# Patient Record
Sex: MALE | Race: WHITE | NOT HISPANIC OR LATINO | Employment: UNEMPLOYED | URBAN - METROPOLITAN AREA
[De-identification: names, ages, dates, MRNs, and addresses within clinical notes are randomized per-mention and may not be internally consistent; named-entity substitution may affect disease eponyms.]

---

## 2017-01-01 ENCOUNTER — HOSPITAL ENCOUNTER (OUTPATIENT)
Dept: PREADMISSION TESTING | Facility: HOSPITAL | Age: 67
Discharge: HOME | End: 2017-12-30
Attending: INTERNAL MEDICINE | Admitting: INTERNAL MEDICINE

## 2017-01-01 LAB
ALBUMIN SERPL-MCNC: 2.9 G/DL (ref 3.5–4.8)
ALBUMIN/GLOB SERPL: 1 {RATIO} (ref 1–1.7)
ALP SERPL-CCNC: 106 IU/L (ref 32–91)
ALT SERPL-CCNC: 9 IU/L (ref 17–63)
ANION GAP SERPL CALC-SCNC: 13.5 MMOL/L (ref 10–20)
AST SERPL-CCNC: 18 IU/L (ref 15–41)
BASOPHILS # BLD AUTO: 0.1 10*3/UL (ref 0–0.2)
BASOPHILS NFR BLD AUTO: 2 % (ref 0–2)
BILIRUB SERPL-MCNC: 1.2 MG/DL (ref 0.3–1.2)
BUN SERPL-MCNC: 11 MG/DL (ref 8–20)
BUN/CREAT SERPL: 4.4 (ref 6.2–20.3)
CALCIUM SERPL-MCNC: 8.6 MG/DL (ref 8.9–10.3)
CHLORIDE SERPL-SCNC: 101 MMOL/L (ref 101–111)
CONV CO2: 26 MMOL/L (ref 22–32)
CONV TOTAL PROTEIN: 5.9 G/DL (ref 6.1–7.9)
CREAT UR-MCNC: 2.5 MG/DL (ref 0.7–1.2)
DIFFERENTIAL METHOD BLD: (no result)
EOSINOPHIL # BLD AUTO: 0 10*3/UL (ref 0–0.3)
EOSINOPHIL # BLD AUTO: 1 % (ref 0–3)
ERYTHROCYTE [DISTWIDTH] IN BLOOD BY AUTOMATED COUNT: 18.3 % (ref 11.5–14.5)
GLOBULIN UR ELPH-MCNC: 3 G/DL (ref 2.5–3.8)
GLUCOSE SERPL-MCNC: 81 MG/DL (ref 65–99)
HCT VFR BLD AUTO: 37.6 % (ref 40–54)
HGB BLD-MCNC: 12 G/DL (ref 14–18)
LYMPHOCYTES # BLD AUTO: 0.5 10*3/UL (ref 0.8–4.8)
LYMPHOCYTES NFR BLD AUTO: 15 % (ref 18–42)
MAGNESIUM SERPL-MCNC: 1.9 MG/DL (ref 1.8–2.5)
MCH RBC QN AUTO: 31.9 PG (ref 26–32)
MCHC RBC AUTO-ENTMCNC: 31.8 G/DL (ref 32–36)
MCV RBC AUTO: 100.1 FL (ref 80–94)
MONOCYTES # BLD AUTO: 0.5 10*3/UL (ref 0.1–1.3)
MONOCYTES NFR BLD AUTO: 16 % (ref 2–11)
NEUTROPHILS # BLD AUTO: 2.4 10*3/UL (ref 2.3–8.6)
NEUTROPHILS NFR BLD AUTO: 66 % (ref 50–75)
NRBC BLD AUTO-RTO: 0 /100{WBCS}
NRBC/RBC NFR BLD MANUAL: 0 10*3/UL
PLATELET # BLD AUTO: 106 10*3/UL (ref 150–450)
PMV BLD AUTO: 8.1 FL (ref 7.4–10.4)
POTASSIUM SERPL-SCNC: 3.5 MMOL/L (ref 3.6–5.1)
RBC # BLD AUTO: 3.76 10*6/UL (ref 4.6–6)
SODIUM SERPL-SCNC: 137 MMOL/L (ref 136–144)
WBC # BLD AUTO: 3.5 10*3/UL (ref 4.5–11.5)

## 2017-02-08 ENCOUNTER — HOSPITAL ENCOUNTER (OUTPATIENT)
Dept: FAMILY MEDICINE CLINIC | Facility: CLINIC | Age: 67
Setting detail: SPECIMEN
Discharge: HOME | End: 2017-02-08
Attending: FAMILY MEDICINE | Admitting: FAMILY MEDICINE

## 2017-02-08 LAB
ALBUMIN SERPL-MCNC: 2.3 G/DL (ref 3.5–4.8)
ALBUMIN/GLOB SERPL: 0.7 {RATIO} (ref 1–1.7)
ALP SERPL-CCNC: 96 IU/L (ref 32–91)
ALT SERPL-CCNC: 8 IU/L (ref 17–63)
ANION GAP SERPL CALC-SCNC: 12.1 MMOL/L (ref 10–20)
AST SERPL-CCNC: 9 IU/L (ref 15–41)
BASOPHILS # BLD AUTO: 0.1 10*3/UL (ref 0–0.2)
BASOPHILS NFR BLD AUTO: 1 % (ref 0–2)
BILIRUB SERPL-MCNC: 0.5 MG/DL (ref 0.3–1.2)
BUN SERPL-MCNC: 62 MG/DL (ref 8–20)
BUN/CREAT SERPL: 17.7 (ref 6.2–20.3)
CALCIUM SERPL-MCNC: 8.7 MG/DL (ref 8.9–10.3)
CHLORIDE SERPL-SCNC: 116 MMOL/L (ref 101–111)
CHOLEST SERPL-MCNC: 100 MG/DL
CHOLEST/HDLC SERPL: 2.8 {RATIO}
CONV CO2: 18 MMOL/L (ref 22–32)
CONV LDL CHOLESTEROL DIRECT: 42 MG/DL (ref 0–100)
CONV TOTAL PROTEIN: 5.5 G/DL (ref 6.1–7.9)
CREAT UR-MCNC: 3.5 MG/DL (ref 0.7–1.2)
DIFFERENTIAL METHOD BLD: (no result)
EOSINOPHIL # BLD AUTO: 0.2 10*3/UL (ref 0–0.3)
EOSINOPHIL # BLD AUTO: 3 % (ref 0–3)
ERYTHROCYTE [DISTWIDTH] IN BLOOD BY AUTOMATED COUNT: 17.7 % (ref 11.5–14.5)
GLOBULIN UR ELPH-MCNC: 3.2 G/DL (ref 2.5–3.8)
GLUCOSE SERPL-MCNC: 104 MG/DL (ref 65–99)
HCT VFR BLD AUTO: 35 % (ref 40–54)
HDLC SERPL-MCNC: 36 MG/DL
HGB BLD-MCNC: 11 G/DL (ref 14–18)
LDLC/HDLC SERPL: 1.2 {RATIO}
LIPID INTERPRETATION: ABNORMAL
LYMPHOCYTES # BLD AUTO: 1.1 10*3/UL (ref 0.8–4.8)
LYMPHOCYTES NFR BLD AUTO: 14 % (ref 18–42)
MCH RBC QN AUTO: 26.8 PG (ref 26–32)
MCHC RBC AUTO-ENTMCNC: 31.5 G/DL (ref 32–36)
MCV RBC AUTO: 85.1 FL (ref 80–94)
MONOCYTES # BLD AUTO: 0.4 10*3/UL (ref 0.1–1.3)
MONOCYTES NFR BLD AUTO: 5 % (ref 2–11)
NEUTROPHILS # BLD AUTO: 5.9 10*3/UL (ref 2.3–8.6)
NEUTROPHILS NFR BLD AUTO: 77 % (ref 50–75)
NRBC BLD AUTO-RTO: 0 /100{WBCS}
NRBC/RBC NFR BLD MANUAL: 0 10*3/UL
PLATELET # BLD AUTO: 309 10*3/UL (ref 150–450)
PMV BLD AUTO: 9.7 FL (ref 7.4–10.4)
POTASSIUM SERPL-SCNC: ABNORMAL MMOL/L
POTASSIUM SERPL-SCNC: ABNORMAL MMOL/L (ref 3.6–5.1)
RBC # BLD AUTO: 4.12 10*6/UL (ref 4.6–6)
SODIUM SERPL-SCNC: 140 MMOL/L (ref 136–144)
TRIGL SERPL-MCNC: 96 MG/DL
VLDLC SERPL CALC-MCNC: 21.7 MG/DL
WBC # BLD AUTO: 7.6 10*3/UL (ref 4.5–11.5)

## 2017-02-13 ENCOUNTER — HOSPITAL ENCOUNTER (OUTPATIENT)
Dept: FAMILY MEDICINE CLINIC | Facility: CLINIC | Age: 67
Setting detail: SPECIMEN
Discharge: HOME | End: 2017-02-13
Attending: FAMILY MEDICINE | Admitting: FAMILY MEDICINE

## 2017-02-13 LAB
ANION GAP SERPL CALC-SCNC: 14.7 MMOL/L (ref 10–20)
BUN SERPL-MCNC: 49 MG/DL (ref 8–20)
BUN/CREAT SERPL: 16.9 (ref 6.2–20.3)
CALCIUM SERPL-MCNC: 9.2 MG/DL (ref 8.9–10.3)
CHLORIDE SERPL-SCNC: 115 MMOL/L (ref 101–111)
CONV CO2: 19 MMOL/L (ref 22–32)
CREAT UR-MCNC: 2.9 MG/DL (ref 0.7–1.2)
GLUCOSE SERPL-MCNC: 166 MG/DL (ref 65–99)
POTASSIUM SERPL-SCNC: 5.7 MMOL/L (ref 3.6–5.1)
SODIUM SERPL-SCNC: 143 MMOL/L (ref 136–144)

## 2017-04-05 ENCOUNTER — ANESTHESIA (OUTPATIENT)
Dept: PERIOP | Facility: HOSPITAL | Age: 67
End: 2017-04-05

## 2017-04-05 ENCOUNTER — APPOINTMENT (OUTPATIENT)
Dept: GENERAL RADIOLOGY | Facility: HOSPITAL | Age: 67
End: 2017-04-05

## 2017-04-05 ENCOUNTER — ANESTHESIA EVENT (OUTPATIENT)
Dept: PERIOP | Facility: HOSPITAL | Age: 67
End: 2017-04-05

## 2017-04-05 ENCOUNTER — APPOINTMENT (OUTPATIENT)
Dept: MRI IMAGING | Facility: HOSPITAL | Age: 67
End: 2017-04-05

## 2017-04-05 ENCOUNTER — HOSPITAL ENCOUNTER (INPATIENT)
Facility: HOSPITAL | Age: 67
LOS: 27 days | Discharge: NURSING, REHAB OR LONG-TERM CARE | End: 2017-05-02
Attending: INTERNAL MEDICINE | Admitting: INTERNAL MEDICINE

## 2017-04-05 DIAGNOSIS — R26.2 DIFFICULTY WALKING: Primary | ICD-10-CM

## 2017-04-05 DIAGNOSIS — R45.1 AGITATION: ICD-10-CM

## 2017-04-05 DIAGNOSIS — R13.10 DYSPHAGIA, UNSPECIFIED TYPE: ICD-10-CM

## 2017-04-05 PROBLEM — I63.9 ISCHEMIC STROKE (HCC): Status: ACTIVE | Noted: 2017-04-05

## 2017-04-05 LAB
ARTERIAL PATENCY WRIST A: ABNORMAL
ATMOSPHERIC PRESS: 741.2 MMHG
BASE EXCESS BLDA CALC-SCNC: -3.4 MMOL/L (ref 0–2)
BDY SITE: ABNORMAL
GLUCOSE BLDC GLUCOMTR-MCNC: 247 MG/DL (ref 70–130)
HCO3 BLDA-SCNC: 21 MMOL/L (ref 22–28)
HOROWITZ INDEX BLD+IHG-RTO: 100 %
MODALITY: ABNORMAL
O2 A-A PPRESDIFF RESPIRATORY: 0.7 MMHG
PCO2 BLDA: 34.9 MM HG (ref 35–45)
PEEP RESPIRATORY: 5 CM[H2O]
PH BLDA: 7.39 PH UNITS (ref 7.35–7.45)
PO2 BLDA: 500.3 MM HG (ref 80–100)
SAO2 % BLDCOA: 100 % (ref 92–99)
SET MECH RESP RATE: 16
TOTAL RATE: 16 BREATHS/MINUTE
VENTILATOR MODE: ABNORMAL
VT ON VENT VENT: 550 ML

## 2017-04-05 PROCEDURE — 82803 BLOOD GASES ANY COMBINATION: CPT

## 2017-04-05 PROCEDURE — 61645 PERQ ART M-THROMBECT &/NFS: CPT | Performed by: NEUROLOGICAL SURGERY

## 2017-04-05 PROCEDURE — C1887 CATHETER, GUIDING: HCPCS | Performed by: NEUROLOGICAL SURGERY

## 2017-04-05 PROCEDURE — C1769 GUIDE WIRE: HCPCS | Performed by: NEUROLOGICAL SURGERY

## 2017-04-05 PROCEDURE — 94799 UNLISTED PULMONARY SVC/PX: CPT

## 2017-04-05 PROCEDURE — B51CYZA FLUOROSCOPY OF LEFT LOWER EXTREMITY VEINS USING OTHER CONTRAST, GUIDANCE: ICD-10-PCS | Performed by: NEUROLOGICAL SURGERY

## 2017-04-05 PROCEDURE — C1773 RET DEV, INSERTABLE: HCPCS | Performed by: NEUROLOGICAL SURGERY

## 2017-04-05 PROCEDURE — 82962 GLUCOSE BLOOD TEST: CPT

## 2017-04-05 PROCEDURE — 25810000003 SODIUM CHLORIDE 0.9 % WITH KCL 20 MEQ 20-0.9 MEQ/L-% SOLUTION: Performed by: NEUROLOGICAL SURGERY

## 2017-04-05 PROCEDURE — C1894 INTRO/SHEATH, NON-LASER: HCPCS | Performed by: NEUROLOGICAL SURGERY

## 2017-04-05 PROCEDURE — 71010 HC CHEST PA OR AP: CPT

## 2017-04-05 PROCEDURE — B31D1ZZ FLUOROSCOPY OF RIGHT VERTEBRAL ARTERY USING LOW OSMOLAR CONTRAST: ICD-10-PCS | Performed by: NEUROLOGICAL SURGERY

## 2017-04-05 PROCEDURE — 25010000002 PROPOFOL 1000 MG/ML EMULSION: Performed by: INTERNAL MEDICINE

## 2017-04-05 PROCEDURE — 70551 MRI BRAIN STEM W/O DYE: CPT

## 2017-04-05 PROCEDURE — 0BH17EZ INSERTION OF ENDOTRACHEAL AIRWAY INTO TRACHEA, VIA NATURAL OR ARTIFICIAL OPENING: ICD-10-PCS | Performed by: INTERNAL MEDICINE

## 2017-04-05 PROCEDURE — 5A1935Z RESPIRATORY VENTILATION, LESS THAN 24 CONSECUTIVE HOURS: ICD-10-PCS | Performed by: INTERNAL MEDICINE

## 2017-04-05 PROCEDURE — 94002 VENT MGMT INPAT INIT DAY: CPT

## 2017-04-05 PROCEDURE — 75898 FOLLOW-UP ANGIOGRAPHY: CPT

## 2017-04-05 PROCEDURE — 0 IODIXANOL PER 1 ML: Performed by: INTERNAL MEDICINE

## 2017-04-05 PROCEDURE — B31R1ZZ FLUOROSCOPY OF INTRACRANIAL ARTERIES USING LOW OSMOLAR CONTRAST: ICD-10-PCS | Performed by: NEUROLOGICAL SURGERY

## 2017-04-05 PROCEDURE — 99223 1ST HOSP IP/OBS HIGH 75: CPT | Performed by: NEUROLOGICAL SURGERY

## 2017-04-05 PROCEDURE — 25010000002 PROPOFOL 10 MG/ML EMULSION: Performed by: INTERNAL MEDICINE

## 2017-04-05 PROCEDURE — 03CG3ZZ EXTIRPATION OF MATTER FROM INTRACRANIAL ARTERY, PERCUTANEOUS APPROACH: ICD-10-PCS | Performed by: NEUROLOGICAL SURGERY

## 2017-04-05 PROCEDURE — 25010000002 HEPARIN (PORCINE) PER 1000 UNITS: Performed by: NEUROLOGICAL SURGERY

## 2017-04-05 PROCEDURE — 25010000002 PROPOFOL 1000 MG/ML EMULSION: Performed by: NURSE ANESTHETIST, CERTIFIED REGISTERED

## 2017-04-05 RX ORDER — PANTOPRAZOLE SODIUM 40 MG/1
40 TABLET, DELAYED RELEASE ORAL DAILY
COMMUNITY

## 2017-04-05 RX ORDER — CARVEDILOL 25 MG/1
25 TABLET ORAL DAILY
COMMUNITY

## 2017-04-05 RX ORDER — INSULIN GLARGINE 100 [IU]/ML
30 INJECTION, SOLUTION SUBCUTANEOUS
COMMUNITY

## 2017-04-05 RX ORDER — SODIUM CHLORIDE AND POTASSIUM CHLORIDE 150; 900 MG/100ML; MG/100ML
100 INJECTION, SOLUTION INTRAVENOUS CONTINUOUS
Status: DISCONTINUED | OUTPATIENT
Start: 2017-04-05 | End: 2017-04-06

## 2017-04-05 RX ORDER — ASPIRIN 300 MG/1
300 SUPPOSITORY RECTAL DAILY
Status: DISCONTINUED | OUTPATIENT
Start: 2017-04-06 | End: 2017-05-02 | Stop reason: HOSPADM

## 2017-04-05 RX ORDER — DOXAZOSIN MESYLATE 4 MG/1
4 TABLET ORAL NIGHTLY
COMMUNITY

## 2017-04-05 RX ORDER — ATORVASTATIN CALCIUM 80 MG/1
80 TABLET, FILM COATED ORAL NIGHTLY
Status: DISCONTINUED | OUTPATIENT
Start: 2017-04-05 | End: 2017-04-06

## 2017-04-05 RX ORDER — ONDANSETRON 2 MG/ML
4 INJECTION INTRAMUSCULAR; INTRAVENOUS EVERY 6 HOURS PRN
Status: DISCONTINUED | OUTPATIENT
Start: 2017-04-05 | End: 2017-05-02 | Stop reason: HOSPADM

## 2017-04-05 RX ORDER — ASPIRIN 81 MG/1
81 TABLET, CHEWABLE ORAL DAILY
COMMUNITY
End: 2017-05-02 | Stop reason: HOSPADM

## 2017-04-05 RX ORDER — FENTANYL CITRATE 50 UG/ML
50 INJECTION, SOLUTION INTRAMUSCULAR; INTRAVENOUS
Status: DISCONTINUED | OUTPATIENT
Start: 2017-04-05 | End: 2017-04-07

## 2017-04-05 RX ORDER — ASPIRIN 325 MG
325 TABLET ORAL DAILY
Status: DISCONTINUED | OUTPATIENT
Start: 2017-04-06 | End: 2017-05-02 | Stop reason: HOSPADM

## 2017-04-05 RX ORDER — SODIUM CHLORIDE 9 MG/ML
INJECTION, SOLUTION INTRAVENOUS CONTINUOUS PRN
Status: DISCONTINUED | OUTPATIENT
Start: 2017-04-05 | End: 2017-04-05 | Stop reason: SURG

## 2017-04-05 RX ORDER — INSULIN GLARGINE 100 [IU]/ML
80 INJECTION, SOLUTION SUBCUTANEOUS NIGHTLY
COMMUNITY

## 2017-04-05 RX ORDER — DEXTROSE MONOHYDRATE 25 G/50ML
25 INJECTION, SOLUTION INTRAVENOUS
Status: DISCONTINUED | OUTPATIENT
Start: 2017-04-05 | End: 2017-05-02 | Stop reason: HOSPADM

## 2017-04-05 RX ORDER — SODIUM CHLORIDE 0.9 % (FLUSH) 0.9 %
1-10 SYRINGE (ML) INJECTION AS NEEDED
Status: DISCONTINUED | OUTPATIENT
Start: 2017-04-05 | End: 2017-05-02 | Stop reason: HOSPADM

## 2017-04-05 RX ORDER — NICOTINE POLACRILEX 4 MG
15 LOZENGE BUCCAL
Status: DISCONTINUED | OUTPATIENT
Start: 2017-04-05 | End: 2017-05-02 | Stop reason: HOSPADM

## 2017-04-05 RX ORDER — MELATONIN
1000 DAILY
COMMUNITY

## 2017-04-05 RX ORDER — ATORVASTATIN CALCIUM 40 MG/1
40 TABLET, FILM COATED ORAL DAILY
COMMUNITY

## 2017-04-05 RX ORDER — IODIXANOL 320 MG/ML
350 INJECTION, SOLUTION INTRAVASCULAR
Status: COMPLETED | OUTPATIENT
Start: 2017-04-05 | End: 2017-04-05

## 2017-04-05 RX ADMIN — IODIXANOL 145.6 ML: 320 INJECTION, SOLUTION INTRAVASCULAR at 14:15

## 2017-04-05 RX ADMIN — SODIUM CHLORIDE: 9 INJECTION, SOLUTION INTRAVENOUS at 14:47

## 2017-04-05 RX ADMIN — PROPOFOL 50 MCG/KG/MIN: 10 INJECTION, EMULSION INTRAVENOUS at 20:45

## 2017-04-05 RX ADMIN — PROPOFOL 40 MCG/KG/MIN: 10 INJECTION, EMULSION INTRAVENOUS at 14:47

## 2017-04-05 RX ADMIN — POTASSIUM CHLORIDE AND SODIUM CHLORIDE 100 ML/HR: 900; 150 INJECTION, SOLUTION INTRAVENOUS at 19:27

## 2017-04-05 RX ADMIN — PROPOFOL 45 MCG/KG/MIN: 10 INJECTION, EMULSION INTRAVENOUS at 19:06

## 2017-04-05 NOTE — ANESTHESIA PREPROCEDURE EVALUATION
Anesthesia Evaluation        Airway   Dental      Pulmonary - normal exam   Cardiovascular - normal exam    Rhythm: regular  Rate: normal    (+) hypertension,       Neuro/Psych  (+) CVA (2 weeks ago,and this AM),    GI/Hepatic/Renal/Endo    (+) obesity,  diabetes mellitus,     Musculoskeletal     Abdominal   (+) obese,    Substance History      OB/GYN          Other            Phys Exam Other: Pt arrived to Room 19 per EMS.  Intubated upon arrival.  History per surgeon                            Anesthesia Plan    ASA 3 - emergent     general

## 2017-04-05 NOTE — ANESTHESIA POSTPROCEDURE EVALUATION
Patient: Bill Armendariz    Procedure Summary     Date Anesthesia Start Anesthesia Stop Room / Location    04/05/17 1447 1622  [facility] 19 INV /  [facility] HY[facility]ID OR 18/19       Procedure Diagnosis Provider Provider    EMBOLIZATION CEREBRAL (N/A ) No diagnosis on file. MD Lizbeth Purvis MD          Anesthesia Type: general  Last vitals  /86 (04/05/17 1647)    Temp 36.1 °C (96.9 °F) (04/05/17 1647)    Pulse 57 (04/05/17 1647)   Resp 16 (04/05/17 1647)    SpO2 97 % (04/05/17 1647)      Post Anesthesia Care and Evaluation    Patient location during evaluation: PACU  Patient participation: complete - patient participated  Level of consciousness: awake and alert  Pain management: adequate  Airway patency: patent  Anesthetic complications: No anesthetic complications    Cardiovascular status: acceptable  Respiratory status: acceptable  Hydration status: acceptable

## 2017-04-06 ENCOUNTER — APPOINTMENT (OUTPATIENT)
Dept: CT IMAGING | Facility: HOSPITAL | Age: 67
End: 2017-04-06

## 2017-04-06 ENCOUNTER — APPOINTMENT (OUTPATIENT)
Dept: CARDIOLOGY | Facility: HOSPITAL | Age: 67
End: 2017-04-06

## 2017-04-06 ENCOUNTER — APPOINTMENT (OUTPATIENT)
Dept: GENERAL RADIOLOGY | Facility: HOSPITAL | Age: 67
End: 2017-04-06
Attending: INTERNAL MEDICINE

## 2017-04-06 ENCOUNTER — APPOINTMENT (OUTPATIENT)
Dept: CARDIOLOGY | Facility: HOSPITAL | Age: 67
End: 2017-04-06
Attending: NEUROLOGICAL SURGERY

## 2017-04-06 ENCOUNTER — APPOINTMENT (OUTPATIENT)
Dept: GENERAL RADIOLOGY | Facility: HOSPITAL | Age: 67
End: 2017-04-06

## 2017-04-06 LAB
ALBUMIN SERPL-MCNC: 2.2 G/DL (ref 3.5–5.2)
ALBUMIN/GLOB SERPL: 0.7 G/DL
ALP SERPL-CCNC: 110 U/L (ref 39–117)
ALT SERPL W P-5'-P-CCNC: 9 U/L (ref 1–41)
ANION GAP SERPL CALCULATED.3IONS-SCNC: 16.3 MMOL/L
ARTERIAL PATENCY WRIST A: ABNORMAL
ARTERIAL PATENCY WRIST A: ABNORMAL
AST SERPL-CCNC: 11 U/L (ref 1–40)
ATMOSPHERIC PRESS: 738.5 MMHG
ATMOSPHERIC PRESS: 740.2 MMHG
BASE EXCESS BLDA CALC-SCNC: -5 MMOL/L (ref 0–2)
BASE EXCESS BLDA CALC-SCNC: -6.2 MMOL/L (ref 0–2)
BASOPHILS # BLD AUTO: 0.06 10*3/MM3 (ref 0–0.2)
BASOPHILS NFR BLD AUTO: 0.5 % (ref 0–1.5)
BDY SITE: ABNORMAL
BDY SITE: ABNORMAL
BH CV ECHO MEAS - ACS: 1.4 CM
BH CV ECHO MEAS - AO MEAN PG (FULL): 1 MMHG
BH CV ECHO MEAS - AO MEAN PG: 2 MMHG
BH CV ECHO MEAS - AO ROOT AREA (BSA CORRECTED): 1.2
BH CV ECHO MEAS - AO ROOT AREA: 5.7 CM^2
BH CV ECHO MEAS - AO ROOT DIAM: 2.7 CM
BH CV ECHO MEAS - AO V2 MAX: 103 CM/SEC
BH CV ECHO MEAS - AO V2 MEAN: 65.4 CM/SEC
BH CV ECHO MEAS - AO V2 VTI: 17.5 CM
BH CV ECHO MEAS - AVA(I,A): 2.4 CM^2
BH CV ECHO MEAS - AVA(I,D): 2.4 CM^2
BH CV ECHO MEAS - BSA(HAYCOCK): 2.3 M^2
BH CV ECHO MEAS - BSA: 2.2 M^2
BH CV ECHO MEAS - BZI_BMI: 36.8 KILOGRAMS/M^2
BH CV ECHO MEAS - BZI_METRIC_HEIGHT: 170.2 CM
BH CV ECHO MEAS - BZI_METRIC_WEIGHT: 106.6 KG
BH CV ECHO MEAS - CONTRAST EF (2CH): 25.5 ML/M^2
BH CV ECHO MEAS - CONTRAST EF 4CH: 26.1 ML/M^2
BH CV ECHO MEAS - EDV(CUBED): 227 ML
BH CV ECHO MEAS - EDV(MOD-SP2): 157 ML
BH CV ECHO MEAS - EDV(MOD-SP4): 134 ML
BH CV ECHO MEAS - EDV(TEICH): 186.9 ML
BH CV ECHO MEAS - EF(CUBED): 26.7 %
BH CV ECHO MEAS - EF(MOD-SP2): 25.5 %
BH CV ECHO MEAS - EF(MOD-SP4): 26.1 %
BH CV ECHO MEAS - EF(TEICH): 21.1 %
BH CV ECHO MEAS - ESV(CUBED): 166.4 ML
BH CV ECHO MEAS - ESV(MOD-SP2): 117 ML
BH CV ECHO MEAS - ESV(MOD-SP4): 99 ML
BH CV ECHO MEAS - ESV(TEICH): 147.4 ML
BH CV ECHO MEAS - FS: 9.8 %
BH CV ECHO MEAS - IVS/LVPW: 1
BH CV ECHO MEAS - IVSD: 1 CM
BH CV ECHO MEAS - LAT PEAK E' VEL: 5 CM/SEC
BH CV ECHO MEAS - LV DIASTOLIC VOL/BSA (35-75): 61.9 ML/M^2
BH CV ECHO MEAS - LV MASS(C)D: 253.9 GRAMS
BH CV ECHO MEAS - LV MASS(C)DI: 117.3 GRAMS/M^2
BH CV ECHO MEAS - LV MEAN PG: 1 MMHG
BH CV ECHO MEAS - LV SYSTOLIC VOL/BSA (12-30): 45.7 ML/M^2
BH CV ECHO MEAS - LV V1 MAX: 84 CM/SEC
BH CV ECHO MEAS - LV V1 MEAN: 52.8 CM/SEC
BH CV ECHO MEAS - LV V1 VTI: 13.2 CM
BH CV ECHO MEAS - LVIDD: 6.1 CM
BH CV ECHO MEAS - LVIDS: 5.5 CM
BH CV ECHO MEAS - LVLD AP2: 9.3 CM
BH CV ECHO MEAS - LVLD AP4: 8.9 CM
BH CV ECHO MEAS - LVLS AP2: 9 CM
BH CV ECHO MEAS - LVLS AP4: 8.6 CM
BH CV ECHO MEAS - LVOT AREA (M): 3.1 CM^2
BH CV ECHO MEAS - LVOT AREA: 3.1 CM^2
BH CV ECHO MEAS - LVOT DIAM: 2 CM
BH CV ECHO MEAS - LVPWD: 1 CM
BH CV ECHO MEAS - MED PEAK E' VEL: 4 CM/SEC
BH CV ECHO MEAS - MR MAX PG: 67.2 MMHG
BH CV ECHO MEAS - MR MAX VEL: 410 CM/SEC
BH CV ECHO MEAS - MV A DUR: 0.1 SEC
BH CV ECHO MEAS - MV A MAX VEL: 47.4 CM/SEC
BH CV ECHO MEAS - MV DEC SLOPE: 697 CM/SEC^2
BH CV ECHO MEAS - MV DEC TIME: 0.08 SEC
BH CV ECHO MEAS - MV E MAX VEL: 88.8 CM/SEC
BH CV ECHO MEAS - MV E/A: 1.9
BH CV ECHO MEAS - MV MEAN PG: 1 MMHG
BH CV ECHO MEAS - MV P1/2T MAX VEL: 87.3 CM/SEC
BH CV ECHO MEAS - MV P1/2T: 36.7 MSEC
BH CV ECHO MEAS - MV V2 MEAN: 39.8 CM/SEC
BH CV ECHO MEAS - MV V2 VTI: 18.9 CM
BH CV ECHO MEAS - MVA P1/2T LCG: 2.5 CM^2
BH CV ECHO MEAS - MVA(P1/2T): 6 CM^2
BH CV ECHO MEAS - MVA(VTI): 2.2 CM^2
BH CV ECHO MEAS - PA ACC SLOPE: 0 CM/SEC^2
BH CV ECHO MEAS - PA ACC TIME: 0.12 SEC
BH CV ECHO MEAS - PA MAX PG (FULL): 1 MMHG
BH CV ECHO MEAS - PA MAX PG: 2.5 MMHG
BH CV ECHO MEAS - PA PR(ACCEL): 25 MMHG
BH CV ECHO MEAS - PA V2 MAX: 79.3 CM/SEC
BH CV ECHO MEAS - PI END-D VEL: 123 CM/SEC
BH CV ECHO MEAS - PVA(V,A): 2.4 CM^2
BH CV ECHO MEAS - PVA(V,D): 2.4 CM^2
BH CV ECHO MEAS - QP/QS: 0.87
BH CV ECHO MEAS - RAP SYSTOLE: 15 MMHG
BH CV ECHO MEAS - RV MAX PG: 1.5 MMHG
BH CV ECHO MEAS - RV MEAN PG: 1 MMHG
BH CV ECHO MEAS - RV V1 MAX: 60.9 CM/SEC
BH CV ECHO MEAS - RV V1 MEAN: 39 CM/SEC
BH CV ECHO MEAS - RV V1 VTI: 11.5 CM
BH CV ECHO MEAS - RVOT AREA: 3.1 CM^2
BH CV ECHO MEAS - RVOT DIAM: 2 CM
BH CV ECHO MEAS - RVSP: 74 MMHG
BH CV ECHO MEAS - SI(AO): 46.3 ML/M^2
BH CV ECHO MEAS - SI(CUBED): 28 ML/M^2
BH CV ECHO MEAS - SI(LVOT): 19.1 ML/M^2
BH CV ECHO MEAS - SI(MOD-SP2): 18.5 ML/M^2
BH CV ECHO MEAS - SI(MOD-SP4): 16.2 ML/M^2
BH CV ECHO MEAS - SI(TEICH): 18.2 ML/M^2
BH CV ECHO MEAS - SV(AO): 100.2 ML
BH CV ECHO MEAS - SV(CUBED): 60.6 ML
BH CV ECHO MEAS - SV(LVOT): 41.5 ML
BH CV ECHO MEAS - SV(MOD-SP2): 40 ML
BH CV ECHO MEAS - SV(MOD-SP4): 35 ML
BH CV ECHO MEAS - SV(RVOT): 36.1 ML
BH CV ECHO MEAS - SV(TEICH): 39.5 ML
BH CV ECHO MEAS - TAPSE (>1.6): 1.4 CM2
BH CV ECHO MEAS - TR MAX VEL: 384 CM/SEC
BH CV XLRA - RV BASE: 2.8 CM
BH CV XLRA - TDI S': 9 CM/SEC
BILIRUB SERPL-MCNC: 0.5 MG/DL (ref 0.1–1.2)
BUN BLD-MCNC: 28 MG/DL (ref 8–23)
BUN/CREAT SERPL: 14.1 (ref 7–25)
CALCIUM SPEC-SCNC: 8.3 MG/DL (ref 8.6–10.5)
CHLORIDE SERPL-SCNC: 107 MMOL/L (ref 98–107)
CHOLEST SERPL-MCNC: 111 MG/DL (ref 0–200)
CO2 SERPL-SCNC: 16.7 MMOL/L (ref 22–29)
CREAT BLD-MCNC: 1.98 MG/DL (ref 0.76–1.27)
DEPRECATED RDW RBC AUTO: 52.9 FL (ref 37–54)
E/E' RATIO: 20
EOSINOPHIL # BLD AUTO: 0.18 10*3/MM3 (ref 0–0.7)
EOSINOPHIL NFR BLD AUTO: 1.5 % (ref 0.3–6.2)
ERYTHROCYTE [DISTWIDTH] IN BLOOD BY AUTOMATED COUNT: 17.1 % (ref 11.5–14.5)
FERRITIN SERPL-MCNC: 34.97 NG/ML (ref 30–400)
GFR SERPL CREATININE-BSD FRML MDRD: 34 ML/MIN/1.73
GLOBULIN UR ELPH-MCNC: 3 GM/DL
GLUCOSE BLD-MCNC: 185 MG/DL (ref 65–99)
GLUCOSE BLDC GLUCOMTR-MCNC: 137 MG/DL (ref 70–130)
GLUCOSE BLDC GLUCOMTR-MCNC: 162 MG/DL (ref 70–130)
GLUCOSE BLDC GLUCOMTR-MCNC: 165 MG/DL (ref 70–130)
GLUCOSE BLDC GLUCOMTR-MCNC: 188 MG/DL (ref 70–130)
HBA1C MFR BLD: 9.85 % (ref 4.8–5.6)
HCO3 BLDA-SCNC: 18.2 MMOL/L (ref 22–28)
HCO3 BLDA-SCNC: 19.8 MMOL/L (ref 22–28)
HCT VFR BLD AUTO: 39 % (ref 40.4–52.2)
HDLC SERPL-MCNC: 41 MG/DL (ref 40–60)
HGB BLD-MCNC: 11.6 G/DL (ref 13.7–17.6)
HOROWITZ INDEX BLD+IHG-RTO: 40 %
IMM GRANULOCYTES # BLD: 0.03 10*3/MM3 (ref 0–0.03)
IMM GRANULOCYTES NFR BLD: 0.3 % (ref 0–0.5)
IRON 24H UR-MRATE: 18 MCG/DL (ref 59–158)
IRON SATN MFR SERPL: 6 % (ref 20–50)
LDLC SERPL CALC-MCNC: 41 MG/DL (ref 0–100)
LDLC/HDLC SERPL: 1.01 {RATIO}
LEFT ATRIUM VOLUME INDEX: 29 ML/M2
LYMPHOCYTES # BLD AUTO: 0.98 10*3/MM3 (ref 0.9–4.8)
LYMPHOCYTES NFR BLD AUTO: 8.2 % (ref 19.6–45.3)
MCH RBC QN AUTO: 25.4 PG (ref 27–32.7)
MCHC RBC AUTO-ENTMCNC: 29.7 G/DL (ref 32.6–36.4)
MCV RBC AUTO: 85.3 FL (ref 79.8–96.2)
MODALITY: ABNORMAL
MODALITY: ABNORMAL
MONOCYTES # BLD AUTO: 0.79 10*3/MM3 (ref 0.2–1.2)
MONOCYTES NFR BLD AUTO: 6.6 % (ref 5–12)
NEUTROPHILS # BLD AUTO: 9.88 10*3/MM3 (ref 1.9–8.1)
NEUTROPHILS NFR BLD AUTO: 82.9 % (ref 42.7–76)
O2 A-A PPRESDIFF RESPIRATORY: 0.7 MMHG
PCO2 BLDA: 32.2 MM HG (ref 35–45)
PCO2 BLDA: 35.4 MM HG (ref 35–45)
PEEP RESPIRATORY: 5 CM[H2O]
PEEP RESPIRATORY: 5 CM[H2O]
PH BLDA: 7.36 PH UNITS (ref 7.35–7.45)
PH BLDA: 7.36 PH UNITS (ref 7.35–7.45)
PLATELET # BLD AUTO: 303 10*3/MM3 (ref 140–500)
PMV BLD AUTO: 11.1 FL (ref 6–12)
PO2 BLDA: 124.5 MM HG (ref 80–100)
PO2 BLDA: 170 MM HG (ref 80–100)
POTASSIUM BLD-SCNC: 4.5 MMOL/L (ref 3.5–5.2)
PROT SERPL-MCNC: 5.2 G/DL (ref 6–8.5)
PSV: 10 CMH2O
RBC # BLD AUTO: 4.57 10*6/MM3 (ref 4.6–6)
SAO2 % BLDCOA: 98.7 % (ref 92–99)
SAO2 % BLDCOA: 99.5 % (ref 92–99)
SET MECH RESP RATE: 16
SODIUM BLD-SCNC: 140 MMOL/L (ref 136–145)
TIBC SERPL-MCNC: 305 MCG/DL
TOTAL RATE: 16 BREATHS/MINUTE
TOTAL RATE: 24 BREATHS/MINUTE
TRANSFERRIN SERPL-MCNC: 205 MG/DL (ref 200–360)
TRIGL SERPL-MCNC: 143 MG/DL (ref 0–150)
VENTILATOR MODE: ABNORMAL
VENTILATOR MODE: ABNORMAL
VLDLC SERPL-MCNC: 28.6 MG/DL (ref 5–40)
VT ON VENT VENT: 550 ML
WBC NRBC COR # BLD: 11.92 10*3/MM3 (ref 4.5–10.7)

## 2017-04-06 PROCEDURE — 80053 COMPREHEN METABOLIC PANEL: CPT | Performed by: INTERNAL MEDICINE

## 2017-04-06 PROCEDURE — 36600 WITHDRAWAL OF ARTERIAL BLOOD: CPT

## 2017-04-06 PROCEDURE — 93880 EXTRACRANIAL BILAT STUDY: CPT

## 2017-04-06 PROCEDURE — 63710000001 INSULIN ASPART PER 5 UNITS: Performed by: INTERNAL MEDICINE

## 2017-04-06 PROCEDURE — 83036 HEMOGLOBIN GLYCOSYLATED A1C: CPT | Performed by: NEUROLOGICAL SURGERY

## 2017-04-06 PROCEDURE — 70450 CT HEAD/BRAIN W/O DYE: CPT

## 2017-04-06 PROCEDURE — 92610 EVALUATE SWALLOWING FUNCTION: CPT

## 2017-04-06 PROCEDURE — 99222 1ST HOSP IP/OBS MODERATE 55: CPT | Performed by: NURSE PRACTITIONER

## 2017-04-06 PROCEDURE — 85025 COMPLETE CBC W/AUTO DIFF WBC: CPT | Performed by: INTERNAL MEDICINE

## 2017-04-06 PROCEDURE — 94799 UNLISTED PULMONARY SVC/PX: CPT

## 2017-04-06 PROCEDURE — 25810000003 SODIUM CHLORIDE 0.9 % WITH KCL 20 MEQ 20-0.9 MEQ/L-% SOLUTION: Performed by: NEUROLOGICAL SURGERY

## 2017-04-06 PROCEDURE — 84466 ASSAY OF TRANSFERRIN: CPT | Performed by: INTERNAL MEDICINE

## 2017-04-06 PROCEDURE — 86334 IMMUNOFIX E-PHORESIS SERUM: CPT | Performed by: INTERNAL MEDICINE

## 2017-04-06 PROCEDURE — 25010000002 FENTANYL CITRATE (PF) 100 MCG/2ML SOLUTION: Performed by: INTERNAL MEDICINE

## 2017-04-06 PROCEDURE — 93306 TTE W/DOPPLER COMPLETE: CPT

## 2017-04-06 PROCEDURE — 83540 ASSAY OF IRON: CPT | Performed by: INTERNAL MEDICINE

## 2017-04-06 PROCEDURE — 25010000002 PROPOFOL 1000 MG/ML EMULSION: Performed by: INTERNAL MEDICINE

## 2017-04-06 PROCEDURE — 71010 HC CHEST PA OR AP: CPT

## 2017-04-06 PROCEDURE — 73560 X-RAY EXAM OF KNEE 1 OR 2: CPT

## 2017-04-06 PROCEDURE — 82728 ASSAY OF FERRITIN: CPT | Performed by: INTERNAL MEDICINE

## 2017-04-06 PROCEDURE — 80061 LIPID PANEL: CPT | Performed by: NEUROLOGICAL SURGERY

## 2017-04-06 PROCEDURE — 82962 GLUCOSE BLOOD TEST: CPT

## 2017-04-06 PROCEDURE — 93306 TTE W/DOPPLER COMPLETE: CPT | Performed by: INTERNAL MEDICINE

## 2017-04-06 PROCEDURE — 99233 SBSQ HOSP IP/OBS HIGH 50: CPT | Performed by: NURSE PRACTITIONER

## 2017-04-06 PROCEDURE — 82803 BLOOD GASES ANY COMBINATION: CPT

## 2017-04-06 RX ORDER — ATORVASTATIN CALCIUM 20 MG/1
40 TABLET, FILM COATED ORAL NIGHTLY
Status: DISCONTINUED | OUTPATIENT
Start: 2017-04-06 | End: 2017-04-26 | Stop reason: CLARIF

## 2017-04-06 RX ORDER — SODIUM CHLORIDE 9 MG/ML
50 INJECTION, SOLUTION INTRAVENOUS CONTINUOUS
Status: DISCONTINUED | OUTPATIENT
Start: 2017-04-06 | End: 2017-04-08

## 2017-04-06 RX ORDER — PANTOPRAZOLE SODIUM 40 MG/1
40 TABLET, DELAYED RELEASE ORAL
Status: DISCONTINUED | OUTPATIENT
Start: 2017-04-07 | End: 2017-05-02 | Stop reason: HOSPADM

## 2017-04-06 RX ADMIN — SODIUM CHLORIDE 100 ML/HR: 9 INJECTION, SOLUTION INTRAVENOUS at 10:53

## 2017-04-06 RX ADMIN — PROPOFOL 10 MCG/KG/MIN: 10 INJECTION, EMULSION INTRAVENOUS at 06:20

## 2017-04-06 RX ADMIN — FENTANYL CITRATE 50 MCG: 50 INJECTION INTRAMUSCULAR; INTRAVENOUS at 11:23

## 2017-04-06 RX ADMIN — INSULIN ASPART 2 UNITS: 100 INJECTION, SOLUTION INTRAVENOUS; SUBCUTANEOUS at 04:33

## 2017-04-06 RX ADMIN — ATORVASTATIN CALCIUM 40 MG: 80 TABLET, FILM COATED ORAL at 20:30

## 2017-04-06 RX ADMIN — ASPIRIN 325 MG: 325 TABLET ORAL at 15:57

## 2017-04-06 RX ADMIN — INSULIN ASPART 2 UNITS: 100 INJECTION, SOLUTION INTRAVENOUS; SUBCUTANEOUS at 15:57

## 2017-04-06 RX ADMIN — INSULIN ASPART 2 UNITS: 100 INJECTION, SOLUTION INTRAVENOUS; SUBCUTANEOUS at 08:54

## 2017-04-06 RX ADMIN — POTASSIUM CHLORIDE AND SODIUM CHLORIDE 100 ML/HR: 900; 150 INJECTION, SOLUTION INTRAVENOUS at 06:20

## 2017-04-06 RX ADMIN — INSULIN ASPART 2 UNITS: 100 INJECTION, SOLUTION INTRAVENOUS; SUBCUTANEOUS at 12:01

## 2017-04-07 ENCOUNTER — APPOINTMENT (OUTPATIENT)
Dept: CT IMAGING | Facility: HOSPITAL | Age: 67
End: 2017-04-07

## 2017-04-07 LAB
ALBUMIN SERPL-MCNC: 2.3 G/DL (ref 3.5–5.2)
ANION GAP SERPL CALCULATED.3IONS-SCNC: 12.8 MMOL/L
ANISOCYTOSIS BLD QL: NORMAL
BASOPHILS # BLD AUTO: 0.09 10*3/MM3 (ref 0–0.2)
BASOPHILS NFR BLD AUTO: 1 % (ref 0–1.5)
BH CV XLRA MEAS LEFT DIST CCA EDV: -11.8 CM/SEC
BH CV XLRA MEAS LEFT DIST CCA PSV: -46 CM/SEC
BH CV XLRA MEAS LEFT DIST ICA EDV: -24.8 CM/SEC
BH CV XLRA MEAS LEFT DIST ICA PSV: -62.4 CM/SEC
BH CV XLRA MEAS LEFT ICA/CCA RATIO: 1.3
BH CV XLRA MEAS LEFT MID ICA EDV: -19.3 CM/SEC
BH CV XLRA MEAS LEFT MID ICA PSV: -50.4 CM/SEC
BH CV XLRA MEAS LEFT PROX CCA EDV: -10.6 CM/SEC
BH CV XLRA MEAS LEFT PROX CCA PSV: -81.5 CM/SEC
BH CV XLRA MEAS LEFT PROX ECA PSV: -53.4 CM/SEC
BH CV XLRA MEAS LEFT PROX ICA EDV: -18.8 CM/SEC
BH CV XLRA MEAS LEFT PROX ICA PSV: -56.3 CM/SEC
BH CV XLRA MEAS LEFT PROX SCLA PSV: 117 CM/SEC
BH CV XLRA MEAS LEFT VERTEBRAL A EDV: -9.8 CM/SEC
BH CV XLRA MEAS LEFT VERTEBRAL A PSV: -46 CM/SEC
BH CV XLRA MEAS RIGHT DIST CCA EDV: -9.8 CM/SEC
BH CV XLRA MEAS RIGHT DIST CCA PSV: -71.5 CM/SEC
BH CV XLRA MEAS RIGHT DIST ICA EDV: -24.6 CM/SEC
BH CV XLRA MEAS RIGHT DIST ICA PSV: -65.1 CM/SEC
BH CV XLRA MEAS RIGHT ICA/CCA RATIO: 0.9
BH CV XLRA MEAS RIGHT MID ICA EDV: 20.5 CM/SEC
BH CV XLRA MEAS RIGHT MID ICA PSV: 62.1 CM/SEC
BH CV XLRA MEAS RIGHT PROX CCA EDV: -10 CM/SEC
BH CV XLRA MEAS RIGHT PROX CCA PSV: -85 CM/SEC
BH CV XLRA MEAS RIGHT PROX ECA PSV: -95.6 CM/SEC
BH CV XLRA MEAS RIGHT PROX ICA EDV: -9.4 CM/SEC
BH CV XLRA MEAS RIGHT PROX ICA PSV: -44.4 CM/SEC
BH CV XLRA MEAS RIGHT PROX SCLA PSV: 105 CM/SEC
BH CV XLRA MEAS RIGHT VERTEBRAL A EDV: 10 CM/SEC
BH CV XLRA MEAS RIGHT VERTEBRAL A PSV: 53.9 CM/SEC
BUN BLD-MCNC: 30 MG/DL (ref 8–23)
BUN/CREAT SERPL: 14.1 (ref 7–25)
BURR CELLS BLD QL SMEAR: NORMAL
CALCIUM SPEC-SCNC: 8.4 MG/DL (ref 8.6–10.5)
CHLORIDE SERPL-SCNC: 112 MMOL/L (ref 98–107)
CHLORIDE UR-SCNC: 61 MMOL/L
CO2 SERPL-SCNC: 19.2 MMOL/L (ref 22–29)
CREAT BLD-MCNC: 2.13 MG/DL (ref 0.76–1.27)
CREAT UR-MCNC: 86.3 MG/DL
DEPRECATED RDW RBC AUTO: 60.6 FL (ref 37–54)
EOSINOPHIL # BLD AUTO: 0.35 10*3/MM3 (ref 0–0.7)
EOSINOPHIL NFR BLD AUTO: 3.8 % (ref 0.3–6.2)
ERYTHROCYTE [DISTWIDTH] IN BLOOD BY AUTOMATED COUNT: 21.3 % (ref 11.5–14.5)
GFR SERPL CREATININE-BSD FRML MDRD: 31 ML/MIN/1.73
GLUCOSE BLD-MCNC: 131 MG/DL (ref 65–99)
GLUCOSE BLDC GLUCOMTR-MCNC: 105 MG/DL (ref 70–130)
GLUCOSE BLDC GLUCOMTR-MCNC: 110 MG/DL (ref 70–130)
GLUCOSE BLDC GLUCOMTR-MCNC: 117 MG/DL (ref 70–130)
GLUCOSE BLDC GLUCOMTR-MCNC: 119 MG/DL (ref 70–130)
GLUCOSE BLDC GLUCOMTR-MCNC: 160 MG/DL (ref 70–130)
HCT VFR BLD AUTO: 41.6 % (ref 40.4–52.2)
HGB BLD-MCNC: 12.5 G/DL (ref 13.7–17.6)
IMM GRANULOCYTES # BLD: 0.03 10*3/MM3 (ref 0–0.03)
IMM GRANULOCYTES NFR BLD: 0.3 % (ref 0–0.5)
LYMPHOCYTES # BLD AUTO: 1.19 10*3/MM3 (ref 0.9–4.8)
LYMPHOCYTES NFR BLD AUTO: 12.8 % (ref 19.6–45.3)
MAGNESIUM SERPL-MCNC: 1.9 MG/DL (ref 1.6–2.4)
MCH RBC QN AUTO: 25.4 PG (ref 27–32.7)
MCHC RBC AUTO-ENTMCNC: 30 G/DL (ref 32.6–36.4)
MCV RBC AUTO: 84.4 FL (ref 79.8–96.2)
MONOCYTES # BLD AUTO: 0.83 10*3/MM3 (ref 0.2–1.2)
MONOCYTES NFR BLD AUTO: 8.9 % (ref 5–12)
NEUTROPHILS # BLD AUTO: 6.82 10*3/MM3 (ref 1.9–8.1)
NEUTROPHILS NFR BLD AUTO: 73.2 % (ref 42.7–76)
NRBC BLD MANUAL-RTO: 0 /100 WBC (ref 0–0)
OVALOCYTES BLD QL SMEAR: NORMAL
PHOSPHATE SERPL-MCNC: 4.9 MG/DL (ref 2.5–4.5)
PLAT MORPH BLD: NORMAL
PLATELET # BLD AUTO: 323 10*3/MM3 (ref 140–500)
PMV BLD AUTO: 11 FL (ref 6–12)
POLYCHROMASIA BLD QL SMEAR: NORMAL
POTASSIUM BLD-SCNC: 5.3 MMOL/L (ref 3.5–5.2)
PROT UR-MCNC: >600 MG/DL
PROT/CREAT UR: NORMAL MG/G CREA
RBC # BLD AUTO: 4.93 10*6/MM3 (ref 4.6–6)
RIGHT ARM BP: NORMAL MMHG
SODIUM BLD-SCNC: 144 MMOL/L (ref 136–145)
SODIUM UR-SCNC: 70 MMOL/L
URATE SERPL-MCNC: 6.9 MG/DL (ref 3.4–7)
WBC MORPH BLD: NORMAL
WBC NRBC COR # BLD: 9.31 10*3/MM3 (ref 4.5–10.7)

## 2017-04-07 PROCEDURE — 82962 GLUCOSE BLOOD TEST: CPT

## 2017-04-07 PROCEDURE — 86335 IMMUNFIX E-PHORSIS/URINE/CSF: CPT | Performed by: INTERNAL MEDICINE

## 2017-04-07 PROCEDURE — 85025 COMPLETE CBC W/AUTO DIFF WBC: CPT | Performed by: INTERNAL MEDICINE

## 2017-04-07 PROCEDURE — 70450 CT HEAD/BRAIN W/O DYE: CPT

## 2017-04-07 PROCEDURE — 63710000001 DIPHENHYDRAMINE PER 50 MG: Performed by: INTERNAL MEDICINE

## 2017-04-07 PROCEDURE — 63710000001 INSULIN ASPART PER 5 UNITS: Performed by: INTERNAL MEDICINE

## 2017-04-07 PROCEDURE — 99232 SBSQ HOSP IP/OBS MODERATE 35: CPT | Performed by: NEUROLOGICAL SURGERY

## 2017-04-07 PROCEDURE — 25010000002 HYDRALAZINE PER 20 MG: Performed by: INTERNAL MEDICINE

## 2017-04-07 PROCEDURE — 25010000002 FENTANYL CITRATE (PF) 100 MCG/2ML SOLUTION: Performed by: INTERNAL MEDICINE

## 2017-04-07 PROCEDURE — 83735 ASSAY OF MAGNESIUM: CPT | Performed by: INTERNAL MEDICINE

## 2017-04-07 PROCEDURE — 99233 SBSQ HOSP IP/OBS HIGH 50: CPT | Performed by: NURSE PRACTITIONER

## 2017-04-07 PROCEDURE — 82436 ASSAY OF URINE CHLORIDE: CPT | Performed by: INTERNAL MEDICINE

## 2017-04-07 PROCEDURE — 92526 ORAL FUNCTION THERAPY: CPT

## 2017-04-07 PROCEDURE — 25010000002 IRON SUCROSE PER 1 MG: Performed by: INTERNAL MEDICINE

## 2017-04-07 PROCEDURE — 80069 RENAL FUNCTION PANEL: CPT | Performed by: INTERNAL MEDICINE

## 2017-04-07 PROCEDURE — 85007 BL SMEAR W/DIFF WBC COUNT: CPT | Performed by: INTERNAL MEDICINE

## 2017-04-07 PROCEDURE — 84300 ASSAY OF URINE SODIUM: CPT | Performed by: INTERNAL MEDICINE

## 2017-04-07 PROCEDURE — 84156 ASSAY OF PROTEIN URINE: CPT | Performed by: INTERNAL MEDICINE

## 2017-04-07 PROCEDURE — 84550 ASSAY OF BLOOD/URIC ACID: CPT | Performed by: INTERNAL MEDICINE

## 2017-04-07 PROCEDURE — 82570 ASSAY OF URINE CREATININE: CPT | Performed by: INTERNAL MEDICINE

## 2017-04-07 RX ORDER — DIPHENHYDRAMINE HCL 25 MG
50 CAPSULE ORAL ONCE
Status: COMPLETED | OUTPATIENT
Start: 2017-04-07 | End: 2017-04-07

## 2017-04-07 RX ORDER — ACETAMINOPHEN 325 MG/1
650 TABLET ORAL ONCE
Status: COMPLETED | OUTPATIENT
Start: 2017-04-07 | End: 2017-04-07

## 2017-04-07 RX ORDER — SODIUM BICARBONATE 650 MG/1
1300 TABLET ORAL 3 TIMES DAILY
Status: DISCONTINUED | OUTPATIENT
Start: 2017-04-07 | End: 2017-04-13

## 2017-04-07 RX ORDER — HYDRALAZINE HYDROCHLORIDE 20 MG/ML
10 INJECTION INTRAMUSCULAR; INTRAVENOUS EVERY 4 HOURS PRN
Status: DISCONTINUED | OUTPATIENT
Start: 2017-04-07 | End: 2017-04-09

## 2017-04-07 RX ADMIN — ACETAMINOPHEN 650 MG: 325 TABLET ORAL at 15:50

## 2017-04-07 RX ADMIN — PANTOPRAZOLE SODIUM 40 MG: 40 TABLET, DELAYED RELEASE ORAL at 05:10

## 2017-04-07 RX ADMIN — IRON SUCROSE 300 MG: 20 INJECTION, SOLUTION INTRAVENOUS at 16:03

## 2017-04-07 RX ADMIN — ASPIRIN 325 MG: 325 TABLET ORAL at 08:39

## 2017-04-07 RX ADMIN — HYDRALAZINE HYDROCHLORIDE 10 MG: 20 INJECTION INTRAMUSCULAR; INTRAVENOUS at 21:30

## 2017-04-07 RX ADMIN — DIPHENHYDRAMINE HYDROCHLORIDE 50 MG: 25 CAPSULE ORAL at 15:50

## 2017-04-07 RX ADMIN — INSULIN ASPART 2 UNITS: 100 INJECTION, SOLUTION INTRAVENOUS; SUBCUTANEOUS at 22:08

## 2017-04-07 RX ADMIN — FENTANYL CITRATE 50 MCG: 50 INJECTION INTRAMUSCULAR; INTRAVENOUS at 10:52

## 2017-04-07 RX ADMIN — ATORVASTATIN CALCIUM 40 MG: 80 TABLET, FILM COATED ORAL at 21:26

## 2017-04-07 RX ADMIN — SODIUM BICARBONATE 1300 MG: 650 TABLET, ORALLY DISINTEGRATING ORAL at 21:26

## 2017-04-07 RX ADMIN — SODIUM BICARBONATE 1300 MG: 650 TABLET, ORALLY DISINTEGRATING ORAL at 15:51

## 2017-04-08 ENCOUNTER — APPOINTMENT (OUTPATIENT)
Dept: CT IMAGING | Facility: HOSPITAL | Age: 67
End: 2017-04-08

## 2017-04-08 LAB
ALBUMIN SERPL-MCNC: 2.5 G/DL (ref 3.5–5.2)
ANION GAP SERPL CALCULATED.3IONS-SCNC: 16.3 MMOL/L
ANISOCYTOSIS BLD QL: NORMAL
BASOPHILS # BLD AUTO: 0.13 10*3/MM3 (ref 0–0.2)
BASOPHILS NFR BLD AUTO: 1 % (ref 0–1.5)
BUN BLD-MCNC: 32 MG/DL (ref 8–23)
BUN/CREAT SERPL: 14.2 (ref 7–25)
CALCIUM SPEC-SCNC: 8.2 MG/DL (ref 8.6–10.5)
CHLORIDE SERPL-SCNC: 114 MMOL/L (ref 98–107)
CO2 SERPL-SCNC: 14.7 MMOL/L (ref 22–29)
CREAT BLD-MCNC: 2.25 MG/DL (ref 0.76–1.27)
DEPRECATED RDW RBC AUTO: 54.1 FL (ref 37–54)
EOSINOPHIL # BLD AUTO: 0.4 10*3/MM3 (ref 0–0.7)
EOSINOPHIL NFR BLD AUTO: 3.1 % (ref 0.3–6.2)
ERYTHROCYTE [DISTWIDTH] IN BLOOD BY AUTOMATED COUNT: 17.7 % (ref 11.5–14.5)
GFR SERPL CREATININE-BSD FRML MDRD: 29 ML/MIN/1.73
GLUCOSE BLD-MCNC: 225 MG/DL (ref 65–99)
GLUCOSE BLDC GLUCOMTR-MCNC: 102 MG/DL (ref 70–130)
GLUCOSE BLDC GLUCOMTR-MCNC: 136 MG/DL (ref 70–130)
GLUCOSE BLDC GLUCOMTR-MCNC: 175 MG/DL (ref 70–130)
GLUCOSE BLDC GLUCOMTR-MCNC: 194 MG/DL (ref 70–130)
GLUCOSE BLDC GLUCOMTR-MCNC: 257 MG/DL (ref 70–130)
HCT VFR BLD AUTO: 38.9 % (ref 40.4–52.2)
HGB BLD-MCNC: 11.9 G/DL (ref 13.7–17.6)
IMM GRANULOCYTES # BLD: 0.11 10*3/MM3 (ref 0–0.03)
IMM GRANULOCYTES NFR BLD: 0.9 % (ref 0–0.5)
LYMPHOCYTES # BLD AUTO: 1.08 10*3/MM3 (ref 0.9–4.8)
LYMPHOCYTES NFR BLD AUTO: 8.4 % (ref 19.6–45.3)
MAGNESIUM SERPL-MCNC: 2 MG/DL (ref 1.6–2.4)
MCH RBC QN AUTO: 25.9 PG (ref 27–32.7)
MCHC RBC AUTO-ENTMCNC: 30.6 G/DL (ref 32.6–36.4)
MCV RBC AUTO: 84.7 FL (ref 79.8–96.2)
MONOCYTES # BLD AUTO: 0.96 10*3/MM3 (ref 0.2–1.2)
MONOCYTES NFR BLD AUTO: 7.5 % (ref 5–12)
NEUTROPHILS # BLD AUTO: 10.2 10*3/MM3 (ref 1.9–8.1)
NEUTROPHILS NFR BLD AUTO: 79.1 % (ref 42.7–76)
NRBC BLD MANUAL-RTO: 0 /100 WBC (ref 0–0)
OVALOCYTES BLD QL SMEAR: NORMAL
PHOSPHATE SERPL-MCNC: 4.6 MG/DL (ref 2.5–4.5)
PLAT MORPH BLD: NORMAL
PLATELET # BLD AUTO: 293 10*3/MM3 (ref 140–500)
PMV BLD AUTO: 11.2 FL (ref 6–12)
POIKILOCYTOSIS BLD QL SMEAR: NORMAL
POTASSIUM BLD-SCNC: 5.3 MMOL/L (ref 3.5–5.2)
RBC # BLD AUTO: 4.59 10*6/MM3 (ref 4.6–6)
SCHISTOCYTES BLD QL SMEAR: NORMAL
SODIUM BLD-SCNC: 145 MMOL/L (ref 136–145)
URATE SERPL-MCNC: 6.7 MG/DL (ref 3.4–7)
WBC MORPH BLD: NORMAL
WBC NRBC COR # BLD: 12.88 10*3/MM3 (ref 4.5–10.7)

## 2017-04-08 PROCEDURE — 70450 CT HEAD/BRAIN W/O DYE: CPT

## 2017-04-08 PROCEDURE — 85025 COMPLETE CBC W/AUTO DIFF WBC: CPT | Performed by: INTERNAL MEDICINE

## 2017-04-08 PROCEDURE — 97166 OT EVAL MOD COMPLEX 45 MIN: CPT

## 2017-04-08 PROCEDURE — 84550 ASSAY OF BLOOD/URIC ACID: CPT | Performed by: INTERNAL MEDICINE

## 2017-04-08 PROCEDURE — 63710000001 INSULIN ASPART PER 5 UNITS: Performed by: INTERNAL MEDICINE

## 2017-04-08 PROCEDURE — 25010000002 IRON SUCROSE PER 1 MG: Performed by: INTERNAL MEDICINE

## 2017-04-08 PROCEDURE — 85007 BL SMEAR W/DIFF WBC COUNT: CPT | Performed by: INTERNAL MEDICINE

## 2017-04-08 PROCEDURE — 97110 THERAPEUTIC EXERCISES: CPT

## 2017-04-08 PROCEDURE — 80069 RENAL FUNCTION PANEL: CPT | Performed by: INTERNAL MEDICINE

## 2017-04-08 PROCEDURE — 92610 EVALUATE SWALLOWING FUNCTION: CPT

## 2017-04-08 PROCEDURE — 83735 ASSAY OF MAGNESIUM: CPT | Performed by: INTERNAL MEDICINE

## 2017-04-08 PROCEDURE — 82962 GLUCOSE BLOOD TEST: CPT

## 2017-04-08 PROCEDURE — 97162 PT EVAL MOD COMPLEX 30 MIN: CPT

## 2017-04-08 PROCEDURE — 97535 SELF CARE MNGMENT TRAINING: CPT

## 2017-04-08 RX ORDER — BUMETANIDE 0.25 MG/ML
4 INJECTION INTRAMUSCULAR; INTRAVENOUS ONCE
Status: COMPLETED | OUTPATIENT
Start: 2017-04-08 | End: 2017-04-08

## 2017-04-08 RX ADMIN — IRON SUCROSE 300 MG: 20 INJECTION, SOLUTION INTRAVENOUS at 09:54

## 2017-04-08 RX ADMIN — BUMETANIDE 4 MG: 0.25 INJECTION, SOLUTION INTRAMUSCULAR; INTRAVENOUS at 12:29

## 2017-04-08 RX ADMIN — SODIUM BICARBONATE 1300 MG: 650 TABLET, ORALLY DISINTEGRATING ORAL at 16:22

## 2017-04-08 RX ADMIN — ATORVASTATIN CALCIUM 40 MG: 80 TABLET, FILM COATED ORAL at 22:30

## 2017-04-08 RX ADMIN — INSULIN ASPART 2 UNITS: 100 INJECTION, SOLUTION INTRAVENOUS; SUBCUTANEOUS at 09:49

## 2017-04-08 RX ADMIN — ASPIRIN 325 MG: 325 TABLET ORAL at 09:49

## 2017-04-08 RX ADMIN — PANTOPRAZOLE SODIUM 40 MG: 40 TABLET, DELAYED RELEASE ORAL at 05:27

## 2017-04-08 RX ADMIN — SODIUM BICARBONATE 1300 MG: 650 TABLET, ORALLY DISINTEGRATING ORAL at 09:53

## 2017-04-08 RX ADMIN — INSULIN ASPART 6 UNITS: 100 INJECTION, SOLUTION INTRAVENOUS; SUBCUTANEOUS at 12:30

## 2017-04-08 RX ADMIN — SODIUM BICARBONATE 1300 MG: 650 TABLET, ORALLY DISINTEGRATING ORAL at 22:30

## 2017-04-09 ENCOUNTER — APPOINTMENT (OUTPATIENT)
Dept: CARDIOLOGY | Facility: HOSPITAL | Age: 67
End: 2017-04-09
Attending: NEUROLOGICAL SURGERY

## 2017-04-09 LAB
ALBUMIN SERPL-MCNC: 2.2 G/DL (ref 3.5–5.2)
ANION GAP SERPL CALCULATED.3IONS-SCNC: 16.2 MMOL/L
BASOPHILS # BLD AUTO: 0.08 10*3/MM3 (ref 0–0.2)
BASOPHILS NFR BLD AUTO: 0.8 % (ref 0–1.5)
BUN BLD-MCNC: 28 MG/DL (ref 8–23)
BUN/CREAT SERPL: 12.3 (ref 7–25)
CALCIUM SPEC-SCNC: 8.8 MG/DL (ref 8.6–10.5)
CHLORIDE SERPL-SCNC: 112 MMOL/L (ref 98–107)
CO2 SERPL-SCNC: 16.8 MMOL/L (ref 22–29)
CREAT BLD-MCNC: 2.27 MG/DL (ref 0.76–1.27)
DEPRECATED RDW RBC AUTO: 54.8 FL (ref 37–54)
EOSINOPHIL # BLD AUTO: 0.47 10*3/MM3 (ref 0–0.7)
EOSINOPHIL NFR BLD AUTO: 4.6 % (ref 0.3–6.2)
ERYTHROCYTE [DISTWIDTH] IN BLOOD BY AUTOMATED COUNT: 18 % (ref 11.5–14.5)
GFR SERPL CREATININE-BSD FRML MDRD: 29 ML/MIN/1.73
GLUCOSE BLD-MCNC: 124 MG/DL (ref 65–99)
GLUCOSE BLDC GLUCOMTR-MCNC: 123 MG/DL (ref 70–130)
GLUCOSE BLDC GLUCOMTR-MCNC: 202 MG/DL (ref 70–130)
GLUCOSE BLDC GLUCOMTR-MCNC: 213 MG/DL (ref 70–130)
GLUCOSE BLDC GLUCOMTR-MCNC: 222 MG/DL (ref 70–130)
HCT VFR BLD AUTO: 39.7 % (ref 40.4–52.2)
HGB BLD-MCNC: 11.9 G/DL (ref 13.7–17.6)
IMM GRANULOCYTES # BLD: 0.07 10*3/MM3 (ref 0–0.03)
IMM GRANULOCYTES NFR BLD: 0.7 % (ref 0–0.5)
LYMPHOCYTES # BLD AUTO: 0.8 10*3/MM3 (ref 0.9–4.8)
LYMPHOCYTES NFR BLD AUTO: 7.8 % (ref 19.6–45.3)
MAGNESIUM SERPL-MCNC: 1.7 MG/DL (ref 1.6–2.4)
MCH RBC QN AUTO: 25.5 PG (ref 27–32.7)
MCHC RBC AUTO-ENTMCNC: 30 G/DL (ref 32.6–36.4)
MCV RBC AUTO: 85.2 FL (ref 79.8–96.2)
MONOCYTES # BLD AUTO: 0.75 10*3/MM3 (ref 0.2–1.2)
MONOCYTES NFR BLD AUTO: 7.3 % (ref 5–12)
NEUTROPHILS # BLD AUTO: 8.11 10*3/MM3 (ref 1.9–8.1)
NEUTROPHILS NFR BLD AUTO: 78.8 % (ref 42.7–76)
NRBC BLD MANUAL-RTO: 0.3 /100 WBC (ref 0–0)
PHOSPHATE SERPL-MCNC: 3.7 MG/DL (ref 2.5–4.5)
PLATELET # BLD AUTO: 336 10*3/MM3 (ref 140–500)
PMV BLD AUTO: 10.8 FL (ref 6–12)
POTASSIUM BLD-SCNC: 3.8 MMOL/L (ref 3.5–5.2)
RBC # BLD AUTO: 4.66 10*6/MM3 (ref 4.6–6)
SODIUM BLD-SCNC: 145 MMOL/L (ref 136–145)
URATE SERPL-MCNC: 6.9 MG/DL (ref 3.4–7)
WBC NRBC COR # BLD: 10.28 10*3/MM3 (ref 4.5–10.7)

## 2017-04-09 PROCEDURE — 82962 GLUCOSE BLOOD TEST: CPT

## 2017-04-09 PROCEDURE — 93971 EXTREMITY STUDY: CPT

## 2017-04-09 PROCEDURE — 63710000001 INSULIN ASPART PER 5 UNITS: Performed by: INTERNAL MEDICINE

## 2017-04-09 PROCEDURE — 84550 ASSAY OF BLOOD/URIC ACID: CPT | Performed by: INTERNAL MEDICINE

## 2017-04-09 PROCEDURE — 85025 COMPLETE CBC W/AUTO DIFF WBC: CPT | Performed by: INTERNAL MEDICINE

## 2017-04-09 PROCEDURE — 97110 THERAPEUTIC EXERCISES: CPT

## 2017-04-09 PROCEDURE — 25010000002 IRON SUCROSE PER 1 MG: Performed by: INTERNAL MEDICINE

## 2017-04-09 PROCEDURE — 80069 RENAL FUNCTION PANEL: CPT | Performed by: INTERNAL MEDICINE

## 2017-04-09 PROCEDURE — 83735 ASSAY OF MAGNESIUM: CPT | Performed by: INTERNAL MEDICINE

## 2017-04-09 PROCEDURE — 25010000002 HYDRALAZINE PER 20 MG: Performed by: INTERNAL MEDICINE

## 2017-04-09 RX ORDER — HYDRALAZINE HYDROCHLORIDE 25 MG/1
25 TABLET, FILM COATED ORAL EVERY 8 HOURS SCHEDULED
Status: DISCONTINUED | OUTPATIENT
Start: 2017-04-09 | End: 2017-04-10

## 2017-04-09 RX ADMIN — ATORVASTATIN CALCIUM 40 MG: 80 TABLET, FILM COATED ORAL at 21:06

## 2017-04-09 RX ADMIN — SODIUM BICARBONATE 1300 MG: 650 TABLET, ORALLY DISINTEGRATING ORAL at 08:58

## 2017-04-09 RX ADMIN — HYDRALAZINE HYDROCHLORIDE 10 MG: 20 INJECTION INTRAMUSCULAR; INTRAVENOUS at 02:40

## 2017-04-09 RX ADMIN — ASPIRIN 325 MG: 325 TABLET ORAL at 08:58

## 2017-04-09 RX ADMIN — HYDRALAZINE HYDROCHLORIDE 25 MG: 25 TABLET ORAL at 23:29

## 2017-04-09 RX ADMIN — INSULIN ASPART 2 UNITS: 100 INJECTION, SOLUTION INTRAVENOUS; SUBCUTANEOUS at 13:15

## 2017-04-09 RX ADMIN — PANTOPRAZOLE SODIUM 40 MG: 40 TABLET, DELAYED RELEASE ORAL at 06:03

## 2017-04-09 RX ADMIN — SODIUM BICARBONATE 1300 MG: 650 TABLET, ORALLY DISINTEGRATING ORAL at 21:06

## 2017-04-09 RX ADMIN — SODIUM BICARBONATE 1300 MG: 650 TABLET, ORALLY DISINTEGRATING ORAL at 17:00

## 2017-04-09 RX ADMIN — INSULIN ASPART 4 UNITS: 100 INJECTION, SOLUTION INTRAVENOUS; SUBCUTANEOUS at 21:13

## 2017-04-09 RX ADMIN — IRON SUCROSE 300 MG: 20 INJECTION, SOLUTION INTRAVENOUS at 08:58

## 2017-04-10 LAB
BH CV UPPER VENOUS LEFT INTERNAL JUGULAR AUGMENT: NORMAL
BH CV UPPER VENOUS LEFT INTERNAL JUGULAR COMPETENT: NORMAL
BH CV UPPER VENOUS LEFT INTERNAL JUGULAR COMPRESS: NORMAL
BH CV UPPER VENOUS LEFT INTERNAL JUGULAR PHASIC: NORMAL
BH CV UPPER VENOUS LEFT INTERNAL JUGULAR SPONT: NORMAL
BH CV UPPER VENOUS LEFT SUBCLAVIAN AUGMENT: NORMAL
BH CV UPPER VENOUS LEFT SUBCLAVIAN COMPETENT: NORMAL
BH CV UPPER VENOUS LEFT SUBCLAVIAN COMPRESS: NORMAL
BH CV UPPER VENOUS LEFT SUBCLAVIAN PHASIC: NORMAL
BH CV UPPER VENOUS LEFT SUBCLAVIAN SPONT: NORMAL
BH CV UPPER VENOUS RIGHT AXILLARY AUGMENT: NORMAL
BH CV UPPER VENOUS RIGHT AXILLARY COMPETENT: NORMAL
BH CV UPPER VENOUS RIGHT AXILLARY COMPRESS: NORMAL
BH CV UPPER VENOUS RIGHT AXILLARY PHASIC: NORMAL
BH CV UPPER VENOUS RIGHT AXILLARY SPONT: NORMAL
BH CV UPPER VENOUS RIGHT BASILIC FOREARM COMPRESS: NORMAL
BH CV UPPER VENOUS RIGHT BASILIC UPPER COMPRESS: NORMAL
BH CV UPPER VENOUS RIGHT BRACHIAL COMPRESS: NORMAL
BH CV UPPER VENOUS RIGHT CEPHALIC FOREARM COLOR: 1
BH CV UPPER VENOUS RIGHT CEPHALIC FOREARM COMPRESS: NORMAL
BH CV UPPER VENOUS RIGHT CEPHALIC FOREARM THROMBUS: NORMAL
BH CV UPPER VENOUS RIGHT CEPHALIC UPPER COLOR: 1
BH CV UPPER VENOUS RIGHT CEPHALIC UPPER COMPRESS: NORMAL
BH CV UPPER VENOUS RIGHT CEPHALIC UPPER THROMBUS: NORMAL
BH CV UPPER VENOUS RIGHT INTERNAL JUGULAR AUGMENT: NORMAL
BH CV UPPER VENOUS RIGHT INTERNAL JUGULAR COMPETENT: NORMAL
BH CV UPPER VENOUS RIGHT INTERNAL JUGULAR COMPRESS: NORMAL
BH CV UPPER VENOUS RIGHT INTERNAL JUGULAR PHASIC: NORMAL
BH CV UPPER VENOUS RIGHT INTERNAL JUGULAR SPONT: NORMAL
BH CV UPPER VENOUS RIGHT RADIAL COMPRESS: NORMAL
BH CV UPPER VENOUS RIGHT SUBCLAVIAN AUGMENT: NORMAL
BH CV UPPER VENOUS RIGHT SUBCLAVIAN COMPETENT: NORMAL
BH CV UPPER VENOUS RIGHT SUBCLAVIAN COMPRESS: NORMAL
BH CV UPPER VENOUS RIGHT SUBCLAVIAN PHASIC: NORMAL
BH CV UPPER VENOUS RIGHT SUBCLAVIAN SPONT: NORMAL
BH CV UPPER VENOUS RIGHT ULNAR COMPRESS: NORMAL
GLUCOSE BLDC GLUCOMTR-MCNC: 147 MG/DL (ref 70–130)
GLUCOSE BLDC GLUCOMTR-MCNC: 167 MG/DL (ref 70–130)
GLUCOSE BLDC GLUCOMTR-MCNC: 192 MG/DL (ref 70–130)
GLUCOSE BLDC GLUCOMTR-MCNC: 236 MG/DL (ref 70–130)
IGA SERPL-MCNC: 196 MG/DL (ref 61–437)
IGG SERPL-MCNC: 576 MG/DL (ref 700–1600)
IGM SERPL-MCNC: 59 MG/DL (ref 20–172)
INTERPRETATION UR IFE-IMP: NORMAL
PROT PATTERN SERPL IFE-IMP: ABNORMAL

## 2017-04-10 PROCEDURE — 99231 SBSQ HOSP IP/OBS SF/LOW 25: CPT | Performed by: PSYCHIATRY & NEUROLOGY

## 2017-04-10 PROCEDURE — 97110 THERAPEUTIC EXERCISES: CPT | Performed by: OCCUPATIONAL THERAPIST

## 2017-04-10 PROCEDURE — 82962 GLUCOSE BLOOD TEST: CPT

## 2017-04-10 PROCEDURE — 63710000001 INSULIN ASPART PER 5 UNITS: Performed by: INTERNAL MEDICINE

## 2017-04-10 PROCEDURE — 97110 THERAPEUTIC EXERCISES: CPT

## 2017-04-10 PROCEDURE — 99233 SBSQ HOSP IP/OBS HIGH 50: CPT | Performed by: NURSE PRACTITIONER

## 2017-04-10 RX ORDER — WARFARIN SODIUM 3 MG/1
3 TABLET ORAL
Status: DISCONTINUED | OUTPATIENT
Start: 2017-04-10 | End: 2017-04-12

## 2017-04-10 RX ORDER — AMLODIPINE BESYLATE 10 MG/1
10 TABLET ORAL
Status: DISCONTINUED | OUTPATIENT
Start: 2017-04-10 | End: 2017-04-15

## 2017-04-10 RX ORDER — AMLODIPINE BESYLATE 5 MG/1
5 TABLET ORAL
Status: DISCONTINUED | OUTPATIENT
Start: 2017-04-10 | End: 2017-04-10

## 2017-04-10 RX ORDER — CARVEDILOL 25 MG/1
25 TABLET ORAL 2 TIMES DAILY WITH MEALS
Status: DISCONTINUED | OUTPATIENT
Start: 2017-04-10 | End: 2017-04-22

## 2017-04-10 RX ADMIN — SODIUM BICARBONATE 1300 MG: 650 TABLET, ORALLY DISINTEGRATING ORAL at 09:56

## 2017-04-10 RX ADMIN — INSULIN ASPART 4 UNITS: 100 INJECTION, SOLUTION INTRAVENOUS; SUBCUTANEOUS at 21:18

## 2017-04-10 RX ADMIN — HYDRALAZINE HYDROCHLORIDE 75 MG: 50 TABLET ORAL at 21:13

## 2017-04-10 RX ADMIN — PANTOPRAZOLE SODIUM 40 MG: 40 TABLET, DELAYED RELEASE ORAL at 06:09

## 2017-04-10 RX ADMIN — ASPIRIN 325 MG: 325 TABLET ORAL at 09:55

## 2017-04-10 RX ADMIN — CARVEDILOL 25 MG: 25 TABLET, FILM COATED ORAL at 17:09

## 2017-04-10 RX ADMIN — AMLODIPINE BESYLATE 10 MG: 10 TABLET ORAL at 17:09

## 2017-04-10 RX ADMIN — ATORVASTATIN CALCIUM 40 MG: 80 TABLET, FILM COATED ORAL at 21:14

## 2017-04-10 RX ADMIN — SODIUM BICARBONATE 1300 MG: 650 TABLET, ORALLY DISINTEGRATING ORAL at 17:11

## 2017-04-10 RX ADMIN — INSULIN ASPART 2 UNITS: 100 INJECTION, SOLUTION INTRAVENOUS; SUBCUTANEOUS at 17:24

## 2017-04-10 RX ADMIN — HYDRALAZINE HYDROCHLORIDE 25 MG: 25 TABLET ORAL at 14:26

## 2017-04-10 RX ADMIN — SODIUM BICARBONATE 1300 MG: 650 TABLET, ORALLY DISINTEGRATING ORAL at 21:13

## 2017-04-10 RX ADMIN — INSULIN ASPART 2 UNITS: 100 INJECTION, SOLUTION INTRAVENOUS; SUBCUTANEOUS at 13:14

## 2017-04-11 LAB
ALBUMIN SERPL-MCNC: 2 G/DL (ref 3.5–5.2)
ALBUMIN/GLOB SERPL: 0.6 G/DL
ALP SERPL-CCNC: 103 U/L (ref 39–117)
ALT SERPL W P-5'-P-CCNC: 10 U/L (ref 1–41)
ANION GAP SERPL CALCULATED.3IONS-SCNC: 16.5 MMOL/L
AST SERPL-CCNC: 14 U/L (ref 1–40)
BILIRUB SERPL-MCNC: 0.6 MG/DL (ref 0.1–1.2)
BUN BLD-MCNC: 35 MG/DL (ref 8–23)
BUN/CREAT SERPL: 15.7 (ref 7–25)
CALCIUM SPEC-SCNC: 8.3 MG/DL (ref 8.6–10.5)
CHLORIDE SERPL-SCNC: 110 MMOL/L (ref 98–107)
CO2 SERPL-SCNC: 16.5 MMOL/L (ref 22–29)
CREAT BLD-MCNC: 2.23 MG/DL (ref 0.76–1.27)
GFR SERPL CREATININE-BSD FRML MDRD: 29 ML/MIN/1.73
GLOBULIN UR ELPH-MCNC: 3.1 GM/DL
GLUCOSE BLD-MCNC: 155 MG/DL (ref 65–99)
GLUCOSE BLDC GLUCOMTR-MCNC: 152 MG/DL (ref 70–130)
GLUCOSE BLDC GLUCOMTR-MCNC: 235 MG/DL (ref 70–130)
GLUCOSE BLDC GLUCOMTR-MCNC: 260 MG/DL (ref 70–130)
GLUCOSE BLDC GLUCOMTR-MCNC: 299 MG/DL (ref 70–130)
INR PPP: 1.23 (ref 0.9–1.1)
POTASSIUM BLD-SCNC: 4.1 MMOL/L (ref 3.5–5.2)
PROT SERPL-MCNC: 5.1 G/DL (ref 6–8.5)
PROTHROMBIN TIME: 15.1 SECONDS (ref 11.7–14.2)
SODIUM BLD-SCNC: 143 MMOL/L (ref 136–145)

## 2017-04-11 PROCEDURE — 82962 GLUCOSE BLOOD TEST: CPT

## 2017-04-11 PROCEDURE — 97110 THERAPEUTIC EXERCISES: CPT | Performed by: OCCUPATIONAL THERAPIST

## 2017-04-11 PROCEDURE — 63710000001 INSULIN ASPART PER 5 UNITS: Performed by: INTERNAL MEDICINE

## 2017-04-11 PROCEDURE — 85610 PROTHROMBIN TIME: CPT | Performed by: INTERNAL MEDICINE

## 2017-04-11 PROCEDURE — 80053 COMPREHEN METABOLIC PANEL: CPT | Performed by: INTERNAL MEDICINE

## 2017-04-11 RX ORDER — BUMETANIDE 0.25 MG/ML
2 INJECTION INTRAMUSCULAR; INTRAVENOUS EVERY 8 HOURS
Status: COMPLETED | OUTPATIENT
Start: 2017-04-11 | End: 2017-04-11

## 2017-04-11 RX ADMIN — SODIUM BICARBONATE 1300 MG: 650 TABLET, ORALLY DISINTEGRATING ORAL at 10:14

## 2017-04-11 RX ADMIN — INSULIN ASPART 2 UNITS: 100 INJECTION, SOLUTION INTRAVENOUS; SUBCUTANEOUS at 10:14

## 2017-04-11 RX ADMIN — HYDRALAZINE HYDROCHLORIDE 75 MG: 50 TABLET ORAL at 05:25

## 2017-04-11 RX ADMIN — PANTOPRAZOLE SODIUM 40 MG: 40 TABLET, DELAYED RELEASE ORAL at 05:25

## 2017-04-11 RX ADMIN — HYDRALAZINE HYDROCHLORIDE 75 MG: 50 TABLET ORAL at 12:10

## 2017-04-11 RX ADMIN — INSULIN ASPART 6 UNITS: 100 INJECTION, SOLUTION INTRAVENOUS; SUBCUTANEOUS at 21:41

## 2017-04-11 RX ADMIN — ASPIRIN 325 MG: 325 TABLET ORAL at 10:14

## 2017-04-11 RX ADMIN — BUMETANIDE 2 MG: 0.25 INJECTION, SOLUTION INTRAMUSCULAR; INTRAVENOUS at 17:32

## 2017-04-11 RX ADMIN — AMLODIPINE BESYLATE 10 MG: 10 TABLET ORAL at 10:14

## 2017-04-11 RX ADMIN — BUMETANIDE 2 MG: 0.25 INJECTION, SOLUTION INTRAMUSCULAR; INTRAVENOUS at 12:05

## 2017-04-11 RX ADMIN — SODIUM BICARBONATE 1300 MG: 650 TABLET, ORALLY DISINTEGRATING ORAL at 20:22

## 2017-04-11 RX ADMIN — INSULIN ASPART 4 UNITS: 100 INJECTION, SOLUTION INTRAVENOUS; SUBCUTANEOUS at 17:34

## 2017-04-11 RX ADMIN — CARVEDILOL 25 MG: 25 TABLET, FILM COATED ORAL at 10:14

## 2017-04-11 RX ADMIN — CARVEDILOL 25 MG: 25 TABLET, FILM COATED ORAL at 17:32

## 2017-04-11 RX ADMIN — HYDRALAZINE HYDROCHLORIDE 75 MG: 50 TABLET ORAL at 20:24

## 2017-04-11 RX ADMIN — INSULIN ASPART 6 UNITS: 100 INJECTION, SOLUTION INTRAVENOUS; SUBCUTANEOUS at 12:10

## 2017-04-11 RX ADMIN — WARFARIN SODIUM 3 MG: 3 TABLET ORAL at 10:13

## 2017-04-11 RX ADMIN — ATORVASTATIN CALCIUM 40 MG: 80 TABLET, FILM COATED ORAL at 20:22

## 2017-04-11 RX ADMIN — SODIUM BICARBONATE 1300 MG: 650 TABLET, ORALLY DISINTEGRATING ORAL at 17:32

## 2017-04-11 RX ADMIN — BUMETANIDE 2 MG: 0.25 INJECTION, SOLUTION INTRAMUSCULAR; INTRAVENOUS at 23:00

## 2017-04-12 ENCOUNTER — APPOINTMENT (OUTPATIENT)
Dept: ULTRASOUND IMAGING | Facility: HOSPITAL | Age: 67
End: 2017-04-12

## 2017-04-12 LAB
ALBUMIN SERPL-MCNC: 2.2 G/DL (ref 3.5–5.2)
ALBUMIN/GLOB SERPL: 0.7 G/DL
ALP SERPL-CCNC: 107 U/L (ref 39–117)
ALT SERPL W P-5'-P-CCNC: 12 U/L (ref 1–41)
ANION GAP SERPL CALCULATED.3IONS-SCNC: 16.4 MMOL/L
AST SERPL-CCNC: 11 U/L (ref 1–40)
BASOPHILS # BLD AUTO: 0.06 10*3/MM3 (ref 0–0.2)
BASOPHILS NFR BLD AUTO: 0.6 % (ref 0–1.5)
BILIRUB SERPL-MCNC: 0.5 MG/DL (ref 0.1–1.2)
BUN BLD-MCNC: 42 MG/DL (ref 8–23)
BUN/CREAT SERPL: 14.7 (ref 7–25)
CALCIUM SPEC-SCNC: 8.5 MG/DL (ref 8.6–10.5)
CHLORIDE SERPL-SCNC: 108 MMOL/L (ref 98–107)
CO2 SERPL-SCNC: 18.6 MMOL/L (ref 22–29)
CREAT BLD-MCNC: 2.86 MG/DL (ref 0.76–1.27)
DEPRECATED RDW RBC AUTO: 55.8 FL (ref 37–54)
EOSINOPHIL # BLD AUTO: 0.46 10*3/MM3 (ref 0–0.7)
EOSINOPHIL NFR BLD AUTO: 4.5 % (ref 0.3–6.2)
ERYTHROCYTE [DISTWIDTH] IN BLOOD BY AUTOMATED COUNT: 18.5 % (ref 11.5–14.5)
GFR SERPL CREATININE-BSD FRML MDRD: 22 ML/MIN/1.73
GLOBULIN UR ELPH-MCNC: 3.3 GM/DL
GLUCOSE BLD-MCNC: 157 MG/DL (ref 65–99)
GLUCOSE BLDC GLUCOMTR-MCNC: 161 MG/DL (ref 70–130)
GLUCOSE BLDC GLUCOMTR-MCNC: 235 MG/DL (ref 70–130)
GLUCOSE BLDC GLUCOMTR-MCNC: 271 MG/DL (ref 70–130)
GLUCOSE BLDC GLUCOMTR-MCNC: 299 MG/DL (ref 70–130)
HCT VFR BLD AUTO: 35.4 % (ref 40.4–52.2)
HGB BLD-MCNC: 10.5 G/DL (ref 13.7–17.6)
IMM GRANULOCYTES # BLD: 0.04 10*3/MM3 (ref 0–0.03)
IMM GRANULOCYTES NFR BLD: 0.4 % (ref 0–0.5)
INR PPP: 1.27 (ref 0.9–1.1)
LYMPHOCYTES # BLD AUTO: 0.89 10*3/MM3 (ref 0.9–4.8)
LYMPHOCYTES NFR BLD AUTO: 8.6 % (ref 19.6–45.3)
MCH RBC QN AUTO: 25.7 PG (ref 27–32.7)
MCHC RBC AUTO-ENTMCNC: 29.7 G/DL (ref 32.6–36.4)
MCV RBC AUTO: 86.8 FL (ref 79.8–96.2)
MONOCYTES # BLD AUTO: 0.62 10*3/MM3 (ref 0.2–1.2)
MONOCYTES NFR BLD AUTO: 6 % (ref 5–12)
NEUTROPHILS # BLD AUTO: 8.25 10*3/MM3 (ref 1.9–8.1)
NEUTROPHILS NFR BLD AUTO: 79.9 % (ref 42.7–76)
PHOSPHATE SERPL-MCNC: 4 MG/DL (ref 2.5–4.5)
PLATELET # BLD AUTO: 285 10*3/MM3 (ref 140–500)
PMV BLD AUTO: 10.6 FL (ref 6–12)
POTASSIUM BLD-SCNC: 4.1 MMOL/L (ref 3.5–5.2)
PROT SERPL-MCNC: 5.5 G/DL (ref 6–8.5)
PROTHROMBIN TIME: 15.4 SECONDS (ref 11.7–14.2)
RBC # BLD AUTO: 4.08 10*6/MM3 (ref 4.6–6)
SODIUM BLD-SCNC: 143 MMOL/L (ref 136–145)
WBC NRBC COR # BLD: 10.32 10*3/MM3 (ref 4.5–10.7)

## 2017-04-12 PROCEDURE — 76775 US EXAM ABDO BACK WALL LIM: CPT

## 2017-04-12 PROCEDURE — 85610 PROTHROMBIN TIME: CPT | Performed by: INTERNAL MEDICINE

## 2017-04-12 PROCEDURE — 85025 COMPLETE CBC W/AUTO DIFF WBC: CPT | Performed by: INTERNAL MEDICINE

## 2017-04-12 PROCEDURE — 84100 ASSAY OF PHOSPHORUS: CPT | Performed by: INTERNAL MEDICINE

## 2017-04-12 PROCEDURE — 97110 THERAPEUTIC EXERCISES: CPT

## 2017-04-12 PROCEDURE — 82962 GLUCOSE BLOOD TEST: CPT

## 2017-04-12 PROCEDURE — 80053 COMPREHEN METABOLIC PANEL: CPT | Performed by: INTERNAL MEDICINE

## 2017-04-12 PROCEDURE — 63710000001 INSULIN ASPART PER 5 UNITS: Performed by: INTERNAL MEDICINE

## 2017-04-12 RX ORDER — ACETAMINOPHEN 325 MG/1
650 TABLET ORAL EVERY 6 HOURS PRN
Status: DISCONTINUED | OUTPATIENT
Start: 2017-04-12 | End: 2017-05-02 | Stop reason: HOSPADM

## 2017-04-12 RX ORDER — WARFARIN SODIUM 5 MG/1
5 TABLET ORAL
Status: DISCONTINUED | OUTPATIENT
Start: 2017-04-13 | End: 2017-04-14

## 2017-04-12 RX ADMIN — PANTOPRAZOLE SODIUM 40 MG: 40 TABLET, DELAYED RELEASE ORAL at 05:32

## 2017-04-12 RX ADMIN — INSULIN ASPART 4 UNITS: 100 INJECTION, SOLUTION INTRAVENOUS; SUBCUTANEOUS at 18:39

## 2017-04-12 RX ADMIN — INSULIN ASPART 6 UNITS: 100 INJECTION, SOLUTION INTRAVENOUS; SUBCUTANEOUS at 22:25

## 2017-04-12 RX ADMIN — ACETAMINOPHEN 650 MG: 325 TABLET ORAL at 18:39

## 2017-04-12 RX ADMIN — WARFARIN SODIUM 3 MG: 3 TABLET ORAL at 18:38

## 2017-04-12 RX ADMIN — HYDRALAZINE HYDROCHLORIDE 75 MG: 50 TABLET ORAL at 05:32

## 2017-04-12 RX ADMIN — BUMETANIDE 2 MG/HR: 0.25 INJECTION INTRAMUSCULAR; INTRAVENOUS at 22:30

## 2017-04-12 RX ADMIN — ATORVASTATIN CALCIUM 40 MG: 80 TABLET, FILM COATED ORAL at 22:20

## 2017-04-12 RX ADMIN — INSULIN ASPART 2 UNITS: 100 INJECTION, SOLUTION INTRAVENOUS; SUBCUTANEOUS at 08:58

## 2017-04-12 RX ADMIN — SODIUM BICARBONATE 1300 MG: 650 TABLET, ORALLY DISINTEGRATING ORAL at 18:38

## 2017-04-12 RX ADMIN — CARVEDILOL 25 MG: 25 TABLET, FILM COATED ORAL at 18:39

## 2017-04-12 RX ADMIN — ACETAMINOPHEN 650 MG: 325 TABLET ORAL at 11:12

## 2017-04-12 RX ADMIN — SODIUM BICARBONATE 1300 MG: 650 TABLET, ORALLY DISINTEGRATING ORAL at 22:20

## 2017-04-12 RX ADMIN — HYDRALAZINE HYDROCHLORIDE 75 MG: 50 TABLET ORAL at 18:38

## 2017-04-12 RX ADMIN — BUMETANIDE 2 MG/HR: 0.25 INJECTION INTRAMUSCULAR; INTRAVENOUS at 13:41

## 2017-04-12 RX ADMIN — CARVEDILOL 25 MG: 25 TABLET, FILM COATED ORAL at 08:58

## 2017-04-12 RX ADMIN — INSULIN ASPART 6 UNITS: 100 INJECTION, SOLUTION INTRAVENOUS; SUBCUTANEOUS at 12:01

## 2017-04-12 RX ADMIN — AMLODIPINE BESYLATE 10 MG: 10 TABLET ORAL at 08:58

## 2017-04-12 RX ADMIN — SODIUM BICARBONATE 1300 MG: 650 TABLET, ORALLY DISINTEGRATING ORAL at 08:58

## 2017-04-12 RX ADMIN — ASPIRIN 325 MG: 325 TABLET ORAL at 08:58

## 2017-04-13 ENCOUNTER — TELEPHONE (OUTPATIENT)
Dept: NEUROSURGERY | Facility: CLINIC | Age: 67
End: 2017-04-13

## 2017-04-13 LAB
ALBUMIN SERPL-MCNC: 2.4 G/DL (ref 3.5–5.2)
ALBUMIN/GLOB SERPL: 0.7 G/DL
ALP SERPL-CCNC: 123 U/L (ref 39–117)
ALT SERPL W P-5'-P-CCNC: 11 U/L (ref 1–41)
ANION GAP SERPL CALCULATED.3IONS-SCNC: 15.6 MMOL/L
AST SERPL-CCNC: 12 U/L (ref 1–40)
BILIRUB SERPL-MCNC: 0.4 MG/DL (ref 0.1–1.2)
BUN BLD-MCNC: 45 MG/DL (ref 8–23)
BUN/CREAT SERPL: 15.1 (ref 7–25)
CALCIUM SPEC-SCNC: 8.6 MG/DL (ref 8.6–10.5)
CHLORIDE SERPL-SCNC: 106 MMOL/L (ref 98–107)
CO2 SERPL-SCNC: 19.4 MMOL/L (ref 22–29)
CREAT BLD-MCNC: 2.98 MG/DL (ref 0.76–1.27)
GFR SERPL CREATININE-BSD FRML MDRD: 21 ML/MIN/1.73
GLOBULIN UR ELPH-MCNC: 3.4 GM/DL
GLUCOSE BLD-MCNC: 137 MG/DL (ref 65–99)
GLUCOSE BLDC GLUCOMTR-MCNC: 136 MG/DL (ref 70–130)
GLUCOSE BLDC GLUCOMTR-MCNC: 145 MG/DL (ref 70–130)
GLUCOSE BLDC GLUCOMTR-MCNC: 167 MG/DL (ref 70–130)
GLUCOSE BLDC GLUCOMTR-MCNC: 239 MG/DL (ref 70–130)
GLUCOSE BLDC GLUCOMTR-MCNC: 283 MG/DL (ref 70–130)
INR PPP: 1.2 (ref 0.9–1.1)
POTASSIUM BLD-SCNC: 4.2 MMOL/L (ref 3.5–5.2)
PROT SERPL-MCNC: 5.8 G/DL (ref 6–8.5)
PROTHROMBIN TIME: 14.8 SECONDS (ref 11.7–14.2)
SODIUM BLD-SCNC: 141 MMOL/L (ref 136–145)

## 2017-04-13 PROCEDURE — 63710000001 INSULIN ASPART PER 5 UNITS: Performed by: INTERNAL MEDICINE

## 2017-04-13 PROCEDURE — 80053 COMPREHEN METABOLIC PANEL: CPT | Performed by: INTERNAL MEDICINE

## 2017-04-13 PROCEDURE — 85610 PROTHROMBIN TIME: CPT | Performed by: INTERNAL MEDICINE

## 2017-04-13 PROCEDURE — 82962 GLUCOSE BLOOD TEST: CPT

## 2017-04-13 PROCEDURE — 25010000002 ALBUMIN HUMAN 25% PER 50 ML: Performed by: INTERNAL MEDICINE

## 2017-04-13 PROCEDURE — 97110 THERAPEUTIC EXERCISES: CPT

## 2017-04-13 PROCEDURE — P9046 ALBUMIN (HUMAN), 25%, 20 ML: HCPCS | Performed by: INTERNAL MEDICINE

## 2017-04-13 RX ORDER — ALBUMIN (HUMAN) 12.5 G/50ML
25 SOLUTION INTRAVENOUS EVERY 8 HOURS
Status: DISCONTINUED | OUTPATIENT
Start: 2017-04-13 | End: 2017-04-18

## 2017-04-13 RX ADMIN — ACETAMINOPHEN 650 MG: 325 TABLET ORAL at 00:45

## 2017-04-13 RX ADMIN — ALBUMIN HUMAN 25 G: 0.25 SOLUTION INTRAVENOUS at 18:20

## 2017-04-13 RX ADMIN — PANTOPRAZOLE SODIUM 40 MG: 40 TABLET, DELAYED RELEASE ORAL at 06:50

## 2017-04-13 RX ADMIN — CARVEDILOL 25 MG: 25 TABLET, FILM COATED ORAL at 17:29

## 2017-04-13 RX ADMIN — HYDRALAZINE HYDROCHLORIDE 75 MG: 50 TABLET ORAL at 10:00

## 2017-04-13 RX ADMIN — HYDRALAZINE HYDROCHLORIDE 75 MG: 50 TABLET ORAL at 14:06

## 2017-04-13 RX ADMIN — BUMETANIDE 2 MG/HR: 0.25 INJECTION INTRAMUSCULAR; INTRAVENOUS at 12:31

## 2017-04-13 RX ADMIN — BUMETANIDE 2 MG/HR: 0.25 INJECTION INTRAMUSCULAR; INTRAVENOUS at 03:05

## 2017-04-13 RX ADMIN — ASPIRIN 325 MG: 325 TABLET ORAL at 08:38

## 2017-04-13 RX ADMIN — BUMETANIDE 2 MG/HR: 0.25 INJECTION INTRAMUSCULAR; INTRAVENOUS at 17:27

## 2017-04-13 RX ADMIN — HYDRALAZINE HYDROCHLORIDE 75 MG: 50 TABLET ORAL at 03:00

## 2017-04-13 RX ADMIN — WARFARIN SODIUM 5 MG: 5 TABLET ORAL at 17:52

## 2017-04-13 RX ADMIN — ATORVASTATIN CALCIUM 40 MG: 80 TABLET, FILM COATED ORAL at 23:13

## 2017-04-13 RX ADMIN — CARVEDILOL 25 MG: 25 TABLET, FILM COATED ORAL at 08:41

## 2017-04-13 RX ADMIN — HYDRALAZINE HYDROCHLORIDE 75 MG: 50 TABLET ORAL at 23:12

## 2017-04-13 RX ADMIN — INSULIN ASPART 6 UNITS: 100 INJECTION, SOLUTION INTRAVENOUS; SUBCUTANEOUS at 17:31

## 2017-04-13 RX ADMIN — SODIUM BICARBONATE 1300 MG: 650 TABLET, ORALLY DISINTEGRATING ORAL at 08:38

## 2017-04-13 RX ADMIN — AMLODIPINE BESYLATE 10 MG: 10 TABLET ORAL at 08:38

## 2017-04-13 RX ADMIN — INSULIN ASPART 4 UNITS: 100 INJECTION, SOLUTION INTRAVENOUS; SUBCUTANEOUS at 12:30

## 2017-04-13 RX ADMIN — BUMETANIDE 2 MG/HR: 0.25 INJECTION INTRAMUSCULAR; INTRAVENOUS at 07:00

## 2017-04-13 NOTE — TELEPHONE ENCOUNTER
----- Message from SHANEKA Moss sent at 4/12/2017  4:31 PM EDT -----  He will need a follow up in 1 month with NIHSS s/p acute stroke with clot retrieval. Thanks!

## 2017-04-14 LAB
ANION GAP SERPL CALCULATED.3IONS-SCNC: 17.2 MMOL/L
BUN BLD-MCNC: 46 MG/DL (ref 8–23)
BUN/CREAT SERPL: 14.2 (ref 7–25)
CALCIUM SPEC-SCNC: 8.4 MG/DL (ref 8.6–10.5)
CHLORIDE SERPL-SCNC: 106 MMOL/L (ref 98–107)
CO2 SERPL-SCNC: 19.8 MMOL/L (ref 22–29)
CREAT BLD-MCNC: 3.25 MG/DL (ref 0.76–1.27)
GFR SERPL CREATININE-BSD FRML MDRD: 19 ML/MIN/1.73
GLUCOSE BLD-MCNC: 256 MG/DL (ref 65–99)
GLUCOSE BLDC GLUCOMTR-MCNC: 157 MG/DL (ref 70–130)
GLUCOSE BLDC GLUCOMTR-MCNC: 161 MG/DL (ref 70–130)
GLUCOSE BLDC GLUCOMTR-MCNC: 174 MG/DL (ref 70–130)
GLUCOSE BLDC GLUCOMTR-MCNC: 231 MG/DL (ref 70–130)
GLUCOSE BLDC GLUCOMTR-MCNC: 259 MG/DL (ref 70–130)
GLUCOSE BLDC GLUCOMTR-MCNC: 266 MG/DL (ref 70–130)
INR PPP: 1.29 (ref 0.9–1.1)
POTASSIUM BLD-SCNC: 5 MMOL/L (ref 3.5–5.2)
PROTHROMBIN TIME: 15.6 SECONDS (ref 11.7–14.2)
SODIUM BLD-SCNC: 143 MMOL/L (ref 136–145)

## 2017-04-14 PROCEDURE — 97530 THERAPEUTIC ACTIVITIES: CPT

## 2017-04-14 PROCEDURE — 82962 GLUCOSE BLOOD TEST: CPT

## 2017-04-14 PROCEDURE — 97110 THERAPEUTIC EXERCISES: CPT

## 2017-04-14 PROCEDURE — 63710000001 INSULIN ASPART PER 5 UNITS: Performed by: INTERNAL MEDICINE

## 2017-04-14 PROCEDURE — 97150 GROUP THERAPEUTIC PROCEDURES: CPT

## 2017-04-14 PROCEDURE — 80048 BASIC METABOLIC PNL TOTAL CA: CPT | Performed by: INTERNAL MEDICINE

## 2017-04-14 PROCEDURE — 25010000002 ALBUMIN HUMAN 25% PER 50 ML: Performed by: INTERNAL MEDICINE

## 2017-04-14 PROCEDURE — 85610 PROTHROMBIN TIME: CPT | Performed by: INTERNAL MEDICINE

## 2017-04-14 PROCEDURE — P9046 ALBUMIN (HUMAN), 25%, 20 ML: HCPCS | Performed by: INTERNAL MEDICINE

## 2017-04-14 RX ORDER — WARFARIN SODIUM 7.5 MG/1
7.5 TABLET ORAL
Status: DISCONTINUED | OUTPATIENT
Start: 2017-04-14 | End: 2017-04-16

## 2017-04-14 RX ADMIN — AMLODIPINE BESYLATE 10 MG: 10 TABLET ORAL at 09:05

## 2017-04-14 RX ADMIN — BUMETANIDE 2 MG/HR: 0.25 INJECTION INTRAMUSCULAR; INTRAVENOUS at 15:23

## 2017-04-14 RX ADMIN — INSULIN ASPART 4 UNITS: 100 INJECTION, SOLUTION INTRAVENOUS; SUBCUTANEOUS at 18:18

## 2017-04-14 RX ADMIN — ALBUMIN HUMAN 25 G: 0.25 SOLUTION INTRAVENOUS at 23:40

## 2017-04-14 RX ADMIN — HYDRALAZINE HYDROCHLORIDE 75 MG: 50 TABLET ORAL at 14:26

## 2017-04-14 RX ADMIN — ATORVASTATIN CALCIUM 40 MG: 80 TABLET, FILM COATED ORAL at 23:39

## 2017-04-14 RX ADMIN — CARVEDILOL 25 MG: 25 TABLET, FILM COATED ORAL at 18:19

## 2017-04-14 RX ADMIN — ASPIRIN 325 MG: 325 TABLET ORAL at 09:05

## 2017-04-14 RX ADMIN — ALBUMIN HUMAN 25 G: 0.25 SOLUTION INTRAVENOUS at 01:20

## 2017-04-14 RX ADMIN — WARFARIN SODIUM 7.5 MG: 7.5 TABLET ORAL at 18:19

## 2017-04-14 RX ADMIN — PANTOPRAZOLE SODIUM 40 MG: 40 TABLET, DELAYED RELEASE ORAL at 06:00

## 2017-04-14 RX ADMIN — INSULIN ASPART 2 UNITS: 100 INJECTION, SOLUTION INTRAVENOUS; SUBCUTANEOUS at 09:04

## 2017-04-14 RX ADMIN — INSULIN ASPART 6 UNITS: 100 INJECTION, SOLUTION INTRAVENOUS; SUBCUTANEOUS at 12:41

## 2017-04-14 RX ADMIN — HYDRALAZINE HYDROCHLORIDE 75 MG: 50 TABLET ORAL at 06:00

## 2017-04-14 RX ADMIN — INSULIN ASPART 2 UNITS: 100 INJECTION, SOLUTION INTRAVENOUS; SUBCUTANEOUS at 01:30

## 2017-04-14 RX ADMIN — HYDRALAZINE HYDROCHLORIDE 75 MG: 50 TABLET ORAL at 23:39

## 2017-04-14 RX ADMIN — ALBUMIN HUMAN 25 G: 0.25 SOLUTION INTRAVENOUS at 15:23

## 2017-04-14 RX ADMIN — BUMETANIDE 2 MG/HR: 0.25 INJECTION INTRAMUSCULAR; INTRAVENOUS at 10:42

## 2017-04-14 RX ADMIN — INSULIN ASPART 6 UNITS: 100 INJECTION, SOLUTION INTRAVENOUS; SUBCUTANEOUS at 23:40

## 2017-04-14 RX ADMIN — BUMETANIDE 2 MG/HR: 0.25 INJECTION INTRAMUSCULAR; INTRAVENOUS at 01:20

## 2017-04-14 RX ADMIN — BUMETANIDE 2 MG/HR: 0.25 INJECTION INTRAMUSCULAR; INTRAVENOUS at 06:00

## 2017-04-14 RX ADMIN — ALBUMIN HUMAN 25 G: 0.25 SOLUTION INTRAVENOUS at 09:00

## 2017-04-14 RX ADMIN — CARVEDILOL 25 MG: 25 TABLET, FILM COATED ORAL at 09:09

## 2017-04-15 LAB
GLUCOSE BLDC GLUCOMTR-MCNC: 149 MG/DL (ref 70–130)
GLUCOSE BLDC GLUCOMTR-MCNC: 152 MG/DL (ref 70–130)
GLUCOSE BLDC GLUCOMTR-MCNC: 201 MG/DL (ref 70–130)
GLUCOSE BLDC GLUCOMTR-MCNC: 280 MG/DL (ref 70–130)
INR PPP: 1.61 (ref 0.9–1.1)
PROTHROMBIN TIME: 18.6 SECONDS (ref 11.7–14.2)

## 2017-04-15 PROCEDURE — P9046 ALBUMIN (HUMAN), 25%, 20 ML: HCPCS | Performed by: INTERNAL MEDICINE

## 2017-04-15 PROCEDURE — 82962 GLUCOSE BLOOD TEST: CPT

## 2017-04-15 PROCEDURE — 25010000002 ALBUMIN HUMAN 25% PER 50 ML: Performed by: INTERNAL MEDICINE

## 2017-04-15 PROCEDURE — 63710000001 INSULIN ASPART PER 5 UNITS: Performed by: INTERNAL MEDICINE

## 2017-04-15 PROCEDURE — 85610 PROTHROMBIN TIME: CPT | Performed by: INTERNAL MEDICINE

## 2017-04-15 RX ORDER — HYDRALAZINE HYDROCHLORIDE 50 MG/1
50 TABLET, FILM COATED ORAL EVERY 8 HOURS SCHEDULED
Status: DISCONTINUED | OUTPATIENT
Start: 2017-04-15 | End: 2017-04-17

## 2017-04-15 RX ORDER — AMLODIPINE BESYLATE 5 MG/1
5 TABLET ORAL
Status: DISCONTINUED | OUTPATIENT
Start: 2017-04-16 | End: 2017-04-15

## 2017-04-15 RX ADMIN — ALBUMIN HUMAN 25 G: 0.25 SOLUTION INTRAVENOUS at 08:29

## 2017-04-15 RX ADMIN — BUMETANIDE 2 MG/HR: 0.25 INJECTION INTRAMUSCULAR; INTRAVENOUS at 02:37

## 2017-04-15 RX ADMIN — CARVEDILOL 25 MG: 25 TABLET, FILM COATED ORAL at 08:29

## 2017-04-15 RX ADMIN — HYDRALAZINE HYDROCHLORIDE 50 MG: 50 TABLET, FILM COATED ORAL at 14:49

## 2017-04-15 RX ADMIN — INSULIN ASPART 6 UNITS: 100 INJECTION, SOLUTION INTRAVENOUS; SUBCUTANEOUS at 17:28

## 2017-04-15 RX ADMIN — HYDRALAZINE HYDROCHLORIDE 50 MG: 50 TABLET, FILM COATED ORAL at 22:26

## 2017-04-15 RX ADMIN — WARFARIN SODIUM 7.5 MG: 7.5 TABLET ORAL at 17:28

## 2017-04-15 RX ADMIN — ALBUMIN HUMAN 25 G: 0.25 SOLUTION INTRAVENOUS at 14:49

## 2017-04-15 RX ADMIN — CARVEDILOL 25 MG: 25 TABLET, FILM COATED ORAL at 22:26

## 2017-04-15 RX ADMIN — ASPIRIN 325 MG: 325 TABLET ORAL at 08:29

## 2017-04-15 RX ADMIN — ATORVASTATIN CALCIUM 40 MG: 80 TABLET, FILM COATED ORAL at 22:26

## 2017-04-15 RX ADMIN — BUMETANIDE 2 MG/HR: 0.25 INJECTION INTRAMUSCULAR; INTRAVENOUS at 12:25

## 2017-04-15 RX ADMIN — PANTOPRAZOLE SODIUM 40 MG: 40 TABLET, DELAYED RELEASE ORAL at 05:22

## 2017-04-15 RX ADMIN — HYDRALAZINE HYDROCHLORIDE 75 MG: 50 TABLET ORAL at 05:22

## 2017-04-15 RX ADMIN — INSULIN ASPART 4 UNITS: 100 INJECTION, SOLUTION INTRAVENOUS; SUBCUTANEOUS at 12:25

## 2017-04-15 RX ADMIN — ALBUMIN HUMAN 25 G: 0.25 SOLUTION INTRAVENOUS at 22:27

## 2017-04-15 RX ADMIN — AMLODIPINE BESYLATE 10 MG: 10 TABLET ORAL at 08:29

## 2017-04-16 ENCOUNTER — APPOINTMENT (OUTPATIENT)
Dept: CT IMAGING | Facility: HOSPITAL | Age: 67
End: 2017-04-16

## 2017-04-16 LAB
ANION GAP SERPL CALCULATED.3IONS-SCNC: 21 MMOL/L
BUN BLD-MCNC: 58 MG/DL (ref 8–23)
BUN/CREAT SERPL: 15.5 (ref 7–25)
CALCIUM SPEC-SCNC: 9 MG/DL (ref 8.6–10.5)
CHLORIDE SERPL-SCNC: 103 MMOL/L (ref 98–107)
CO2 SERPL-SCNC: 19 MMOL/L (ref 22–29)
CREAT BLD-MCNC: 3.74 MG/DL (ref 0.76–1.27)
DEPRECATED RDW RBC AUTO: 58.4 FL (ref 37–54)
ERYTHROCYTE [DISTWIDTH] IN BLOOD BY AUTOMATED COUNT: 18.8 % (ref 11.5–14.5)
GFR SERPL CREATININE-BSD FRML MDRD: 16 ML/MIN/1.73
GLUCOSE BLD-MCNC: 160 MG/DL (ref 65–99)
GLUCOSE BLDC GLUCOMTR-MCNC: 166 MG/DL (ref 70–130)
GLUCOSE BLDC GLUCOMTR-MCNC: 172 MG/DL (ref 70–130)
GLUCOSE BLDC GLUCOMTR-MCNC: 206 MG/DL (ref 70–130)
GLUCOSE BLDC GLUCOMTR-MCNC: 274 MG/DL (ref 70–130)
HCT VFR BLD AUTO: 33 % (ref 40.4–52.2)
HGB BLD-MCNC: 10.3 G/DL (ref 13.7–17.6)
INR PPP: 2.37 (ref 0.9–1.1)
MCH RBC QN AUTO: 26.7 PG (ref 27–32.7)
MCHC RBC AUTO-ENTMCNC: 31.2 G/DL (ref 32.6–36.4)
MCV RBC AUTO: 85.5 FL (ref 79.8–96.2)
PLATELET # BLD AUTO: 285 10*3/MM3 (ref 140–500)
PMV BLD AUTO: 10.6 FL (ref 6–12)
POTASSIUM BLD-SCNC: 4.6 MMOL/L (ref 3.5–5.2)
PROTHROMBIN TIME: 25.1 SECONDS (ref 11.7–14.2)
RBC # BLD AUTO: 3.86 10*6/MM3 (ref 4.6–6)
SODIUM BLD-SCNC: 143 MMOL/L (ref 136–145)
WBC NRBC COR # BLD: 9.61 10*3/MM3 (ref 4.5–10.7)

## 2017-04-16 PROCEDURE — 99222 1ST HOSP IP/OBS MODERATE 55: CPT | Performed by: PSYCHIATRY & NEUROLOGY

## 2017-04-16 PROCEDURE — 25010000002 ALBUMIN HUMAN 25% PER 50 ML: Performed by: INTERNAL MEDICINE

## 2017-04-16 PROCEDURE — 85610 PROTHROMBIN TIME: CPT | Performed by: INTERNAL MEDICINE

## 2017-04-16 PROCEDURE — 63710000001 INSULIN ASPART PER 5 UNITS: Performed by: INTERNAL MEDICINE

## 2017-04-16 PROCEDURE — 85027 COMPLETE CBC AUTOMATED: CPT | Performed by: INTERNAL MEDICINE

## 2017-04-16 PROCEDURE — 25010000002 LEVETIRACETAM IN NACL 0.82% 500 MG/100ML SOLUTION: Performed by: PSYCHIATRY & NEUROLOGY

## 2017-04-16 PROCEDURE — 97110 THERAPEUTIC EXERCISES: CPT | Performed by: PHYSICAL THERAPIST

## 2017-04-16 PROCEDURE — 82962 GLUCOSE BLOOD TEST: CPT

## 2017-04-16 PROCEDURE — 06HN33Z INSERTION OF INFUSION DEVICE INTO LEFT FEMORAL VEIN, PERCUTANEOUS APPROACH: ICD-10-PCS | Performed by: INTERNAL MEDICINE

## 2017-04-16 PROCEDURE — P9046 ALBUMIN (HUMAN), 25%, 20 ML: HCPCS | Performed by: INTERNAL MEDICINE

## 2017-04-16 PROCEDURE — C1751 CATH, INF, PER/CENT/MIDLINE: HCPCS

## 2017-04-16 PROCEDURE — 80048 BASIC METABOLIC PNL TOTAL CA: CPT | Performed by: INTERNAL MEDICINE

## 2017-04-16 PROCEDURE — 70450 CT HEAD/BRAIN W/O DYE: CPT

## 2017-04-16 RX ORDER — LEVETIRACETAM 5 MG/ML
500 INJECTION INTRAVASCULAR EVERY 12 HOURS SCHEDULED
Status: DISCONTINUED | OUTPATIENT
Start: 2017-04-16 | End: 2017-04-18

## 2017-04-16 RX ORDER — WARFARIN SODIUM 4 MG/1
4 TABLET ORAL
Status: DISCONTINUED | OUTPATIENT
Start: 2017-04-16 | End: 2017-05-02 | Stop reason: HOSPADM

## 2017-04-16 RX ADMIN — HYDRALAZINE HYDROCHLORIDE 50 MG: 50 TABLET, FILM COATED ORAL at 21:25

## 2017-04-16 RX ADMIN — BUMETANIDE 2 MG/HR: 0.25 INJECTION INTRAMUSCULAR; INTRAVENOUS at 11:35

## 2017-04-16 RX ADMIN — ALBUMIN HUMAN 25 G: 0.25 SOLUTION INTRAVENOUS at 06:20

## 2017-04-16 RX ADMIN — ATORVASTATIN CALCIUM 40 MG: 80 TABLET, FILM COATED ORAL at 21:25

## 2017-04-16 RX ADMIN — LEVETIRACETAM 500 MG: 5 INJECTION INTRAVENOUS at 21:25

## 2017-04-16 RX ADMIN — ASPIRIN 300 MG: 300 SUPPOSITORY RECTAL at 11:41

## 2017-04-16 RX ADMIN — INSULIN ASPART 5 UNITS: 100 INJECTION, SOLUTION INTRAVENOUS; SUBCUTANEOUS at 18:03

## 2017-04-16 RX ADMIN — LEVETIRACETAM 500 MG: 5 INJECTION INTRAVENOUS at 15:25

## 2017-04-16 RX ADMIN — BUMETANIDE 2 MG/HR: 0.25 INJECTION INTRAMUSCULAR; INTRAVENOUS at 06:38

## 2017-04-16 RX ADMIN — INSULIN ASPART 4 UNITS: 100 INJECTION, SOLUTION INTRAVENOUS; SUBCUTANEOUS at 23:15

## 2017-04-16 RX ADMIN — PANTOPRAZOLE SODIUM 40 MG: 40 TABLET, DELAYED RELEASE ORAL at 05:42

## 2017-04-16 RX ADMIN — ALBUMIN HUMAN 25 G: 0.25 SOLUTION INTRAVENOUS at 17:51

## 2017-04-16 RX ADMIN — WARFARIN SODIUM 4 MG: 4 TABLET ORAL at 21:25

## 2017-04-17 ENCOUNTER — APPOINTMENT (OUTPATIENT)
Dept: NEUROLOGY | Facility: HOSPITAL | Age: 67
End: 2017-04-17
Attending: PSYCHIATRY & NEUROLOGY

## 2017-04-17 LAB
ANION GAP SERPL CALCULATED.3IONS-SCNC: 21.3 MMOL/L
BACTERIA UR QL AUTO: ABNORMAL /HPF
BILIRUB UR QL STRIP: NEGATIVE
BUN BLD-MCNC: 65 MG/DL (ref 8–23)
BUN/CREAT SERPL: 17.8 (ref 7–25)
CALCIUM SPEC-SCNC: 9.1 MG/DL (ref 8.6–10.5)
CHLORIDE SERPL-SCNC: 103 MMOL/L (ref 98–107)
CLARITY UR: ABNORMAL
CO2 SERPL-SCNC: 18.7 MMOL/L (ref 22–29)
COLOR UR: YELLOW
CREAT BLD-MCNC: 3.66 MG/DL (ref 0.76–1.27)
GFR SERPL CREATININE-BSD FRML MDRD: 17 ML/MIN/1.73
GLUCOSE BLD-MCNC: 158 MG/DL (ref 65–99)
GLUCOSE BLDC GLUCOMTR-MCNC: 138 MG/DL (ref 70–130)
GLUCOSE BLDC GLUCOMTR-MCNC: 143 MG/DL (ref 70–130)
GLUCOSE BLDC GLUCOMTR-MCNC: 155 MG/DL (ref 70–130)
GLUCOSE BLDC GLUCOMTR-MCNC: 161 MG/DL (ref 70–130)
GLUCOSE UR STRIP-MCNC: ABNORMAL MG/DL
HGB UR QL STRIP.AUTO: ABNORMAL
HYALINE CASTS UR QL AUTO: ABNORMAL /LPF
INR PPP: 2.96 (ref 0.9–1.1)
KETONES UR QL STRIP: NEGATIVE
LEUKOCYTE ESTERASE UR QL STRIP.AUTO: NEGATIVE
NITRITE UR QL STRIP: NEGATIVE
PH UR STRIP.AUTO: <=5 [PH] (ref 5–8)
POTASSIUM BLD-SCNC: 4.7 MMOL/L (ref 3.5–5.2)
PROT UR QL STRIP: ABNORMAL
PROTHROMBIN TIME: 29.9 SECONDS (ref 11.7–14.2)
RBC # UR: ABNORMAL /HPF
REF LAB TEST METHOD: ABNORMAL
SODIUM BLD-SCNC: 143 MMOL/L (ref 136–145)
SP GR UR STRIP: 1.01 (ref 1–1.03)
SQUAMOUS #/AREA URNS HPF: ABNORMAL /HPF
UROBILINOGEN UR QL STRIP: ABNORMAL
WBC UR QL AUTO: ABNORMAL /HPF

## 2017-04-17 PROCEDURE — 97110 THERAPEUTIC EXERCISES: CPT

## 2017-04-17 PROCEDURE — 99231 SBSQ HOSP IP/OBS SF/LOW 25: CPT | Performed by: PSYCHIATRY & NEUROLOGY

## 2017-04-17 PROCEDURE — 82962 GLUCOSE BLOOD TEST: CPT

## 2017-04-17 PROCEDURE — 85610 PROTHROMBIN TIME: CPT | Performed by: INTERNAL MEDICINE

## 2017-04-17 PROCEDURE — 63710000001 INSULIN ASPART PER 5 UNITS: Performed by: INTERNAL MEDICINE

## 2017-04-17 PROCEDURE — 25010000002 LEVETIRACETAM IN NACL 0.82% 500 MG/100ML SOLUTION: Performed by: PSYCHIATRY & NEUROLOGY

## 2017-04-17 PROCEDURE — 95816 EEG AWAKE AND DROWSY: CPT | Performed by: PSYCHIATRY & NEUROLOGY

## 2017-04-17 PROCEDURE — 25010000002 HALOPERIDOL LACTATE PER 5 MG: Performed by: PSYCHIATRY & NEUROLOGY

## 2017-04-17 PROCEDURE — 80048 BASIC METABOLIC PNL TOTAL CA: CPT | Performed by: INTERNAL MEDICINE

## 2017-04-17 PROCEDURE — 81001 URINALYSIS AUTO W/SCOPE: CPT | Performed by: INTERNAL MEDICINE

## 2017-04-17 PROCEDURE — P9046 ALBUMIN (HUMAN), 25%, 20 ML: HCPCS | Performed by: INTERNAL MEDICINE

## 2017-04-17 PROCEDURE — 25010000002 ALBUMIN HUMAN 25% PER 50 ML: Performed by: INTERNAL MEDICINE

## 2017-04-17 PROCEDURE — 95816 EEG AWAKE AND DROWSY: CPT

## 2017-04-17 RX ORDER — OLANZAPINE 5 MG/1
5 TABLET ORAL
Status: DISCONTINUED | OUTPATIENT
Start: 2017-04-17 | End: 2017-04-18

## 2017-04-17 RX ORDER — HALOPERIDOL 5 MG/ML
2 INJECTION INTRAMUSCULAR
Status: DISCONTINUED | OUTPATIENT
Start: 2017-04-17 | End: 2017-04-18

## 2017-04-17 RX ADMIN — PANTOPRAZOLE SODIUM 40 MG: 40 TABLET, DELAYED RELEASE ORAL at 05:43

## 2017-04-17 RX ADMIN — ALBUMIN HUMAN 25 G: 0.25 SOLUTION INTRAVENOUS at 15:57

## 2017-04-17 RX ADMIN — ASPIRIN 325 MG: 325 TABLET ORAL at 09:39

## 2017-04-17 RX ADMIN — LEVETIRACETAM 500 MG: 5 INJECTION INTRAVENOUS at 09:38

## 2017-04-17 RX ADMIN — INSULIN ASPART 2 UNITS: 100 INJECTION, SOLUTION INTRAVENOUS; SUBCUTANEOUS at 20:40

## 2017-04-17 RX ADMIN — LEVETIRACETAM 500 MG: 5 INJECTION INTRAVENOUS at 20:15

## 2017-04-17 RX ADMIN — CARVEDILOL 25 MG: 25 TABLET, FILM COATED ORAL at 09:39

## 2017-04-17 RX ADMIN — ALBUMIN HUMAN 25 G: 0.25 SOLUTION INTRAVENOUS at 09:38

## 2017-04-17 RX ADMIN — HYDRALAZINE HYDROCHLORIDE 50 MG: 50 TABLET, FILM COATED ORAL at 09:39

## 2017-04-17 RX ADMIN — OLANZAPINE 5 MG: 5 TABLET, FILM COATED ORAL at 17:54

## 2017-04-17 RX ADMIN — INSULIN ASPART 2 UNITS: 100 INJECTION, SOLUTION INTRAVENOUS; SUBCUTANEOUS at 09:38

## 2017-04-17 RX ADMIN — CLONIDINE 1 PATCH: 0.2 PATCH TRANSDERMAL at 14:00

## 2017-04-17 RX ADMIN — ALBUMIN HUMAN 25 G: 0.25 SOLUTION INTRAVENOUS at 23:41

## 2017-04-17 RX ADMIN — WARFARIN SODIUM 4 MG: 4 TABLET ORAL at 17:54

## 2017-04-17 RX ADMIN — HALOPERIDOL LACTATE 2 MG: 5 INJECTION, SOLUTION INTRAMUSCULAR at 18:41

## 2017-04-17 RX ADMIN — ALBUMIN HUMAN 25 G: 0.25 SOLUTION INTRAVENOUS at 00:15

## 2017-04-17 NOTE — TELEPHONE ENCOUNTER
This patient is scheduled with Mary Guevara on 5/12/17 at 2:15. I called [facility] and spoke to Katarzyna and this will be added to the patients discharge. Letter/Form sent

## 2017-04-18 ENCOUNTER — APPOINTMENT (OUTPATIENT)
Dept: MRI IMAGING | Facility: HOSPITAL | Age: 67
End: 2017-04-18

## 2017-04-18 LAB
GLUCOSE BLDC GLUCOMTR-MCNC: 113 MG/DL (ref 70–130)
GLUCOSE BLDC GLUCOMTR-MCNC: 119 MG/DL (ref 70–130)
GLUCOSE BLDC GLUCOMTR-MCNC: 123 MG/DL (ref 70–130)
INR PPP: 3.8 (ref 0.9–1.1)
PROTHROMBIN TIME: 36.4 SECONDS (ref 11.7–14.2)

## 2017-04-18 PROCEDURE — 25010000002 ALBUMIN HUMAN 25% PER 50 ML: Performed by: INTERNAL MEDICINE

## 2017-04-18 PROCEDURE — 70544 MR ANGIOGRAPHY HEAD W/O DYE: CPT

## 2017-04-18 PROCEDURE — 99232 SBSQ HOSP IP/OBS MODERATE 35: CPT | Performed by: NURSE PRACTITIONER

## 2017-04-18 PROCEDURE — 70547 MR ANGIOGRAPHY NECK W/O DYE: CPT

## 2017-04-18 PROCEDURE — 70551 MRI BRAIN STEM W/O DYE: CPT

## 2017-04-18 PROCEDURE — 85610 PROTHROMBIN TIME: CPT | Performed by: INTERNAL MEDICINE

## 2017-04-18 PROCEDURE — 25010000002 LEVETIRACETAM IN NACL 0.82% 500 MG/100ML SOLUTION: Performed by: PSYCHIATRY & NEUROLOGY

## 2017-04-18 PROCEDURE — 82962 GLUCOSE BLOOD TEST: CPT

## 2017-04-18 PROCEDURE — P9046 ALBUMIN (HUMAN), 25%, 20 ML: HCPCS | Performed by: INTERNAL MEDICINE

## 2017-04-18 RX ORDER — HALOPERIDOL 5 MG/ML
1 INJECTION INTRAMUSCULAR EVERY 6 HOURS PRN
Status: DISCONTINUED | OUTPATIENT
Start: 2017-04-18 | End: 2017-05-02 | Stop reason: HOSPADM

## 2017-04-18 RX ADMIN — ASPIRIN 300 MG: 300 SUPPOSITORY RECTAL at 08:21

## 2017-04-18 RX ADMIN — CLONIDINE 1 PATCH: 0.2 PATCH TRANSDERMAL at 18:52

## 2017-04-18 RX ADMIN — ALBUMIN HUMAN 25 G: 0.25 SOLUTION INTRAVENOUS at 08:22

## 2017-04-18 RX ADMIN — LEVETIRACETAM 500 MG: 5 INJECTION INTRAVENOUS at 08:28

## 2017-04-19 LAB
ANION GAP SERPL CALCULATED.3IONS-SCNC: 18.8 MMOL/L
BUN BLD-MCNC: 68 MG/DL (ref 8–23)
BUN/CREAT SERPL: 20.7 (ref 7–25)
CALCIUM SPEC-SCNC: 9.5 MG/DL (ref 8.6–10.5)
CHLORIDE SERPL-SCNC: 106 MMOL/L (ref 98–107)
CO2 SERPL-SCNC: 24.2 MMOL/L (ref 22–29)
CREAT BLD-MCNC: 3.29 MG/DL (ref 0.76–1.27)
GFR SERPL CREATININE-BSD FRML MDRD: 19 ML/MIN/1.73
GLUCOSE BLD-MCNC: 100 MG/DL (ref 65–99)
GLUCOSE BLDC GLUCOMTR-MCNC: 119 MG/DL (ref 70–130)
GLUCOSE BLDC GLUCOMTR-MCNC: 97 MG/DL (ref 70–130)
GLUCOSE BLDC GLUCOMTR-MCNC: 98 MG/DL (ref 70–130)
INR PPP: 4.61 (ref 0.9–1.1)
POTASSIUM BLD-SCNC: 4.1 MMOL/L (ref 3.5–5.2)
PROTHROMBIN TIME: 42.2 SECONDS (ref 11.7–14.2)
SODIUM BLD-SCNC: 149 MMOL/L (ref 136–145)

## 2017-04-19 PROCEDURE — 82962 GLUCOSE BLOOD TEST: CPT

## 2017-04-19 PROCEDURE — 80048 BASIC METABOLIC PNL TOTAL CA: CPT | Performed by: INTERNAL MEDICINE

## 2017-04-19 PROCEDURE — 99231 SBSQ HOSP IP/OBS SF/LOW 25: CPT | Performed by: NURSE PRACTITIONER

## 2017-04-19 PROCEDURE — 85610 PROTHROMBIN TIME: CPT | Performed by: INTERNAL MEDICINE

## 2017-04-19 RX ORDER — DEXTROSE MONOHYDRATE 50 MG/ML
50 INJECTION, SOLUTION INTRAVENOUS CONTINUOUS
Status: DISCONTINUED | OUTPATIENT
Start: 2017-04-19 | End: 2017-04-20

## 2017-04-19 RX ADMIN — ASPIRIN 300 MG: 300 SUPPOSITORY RECTAL at 08:30

## 2017-04-19 RX ADMIN — DEXTROSE MONOHYDRATE 50 ML/HR: 50 INJECTION, SOLUTION INTRAVENOUS at 18:43

## 2017-04-20 LAB
ALBUMIN SERPL-MCNC: 3.9 G/DL (ref 3.5–5.2)
ALBUMIN/GLOB SERPL: 1.4 G/DL
ALP SERPL-CCNC: 94 U/L (ref 39–117)
ALT SERPL W P-5'-P-CCNC: 7 U/L (ref 1–41)
ANION GAP SERPL CALCULATED.3IONS-SCNC: 21.3 MMOL/L
AST SERPL-CCNC: 14 U/L (ref 1–40)
BILIRUB SERPL-MCNC: 0.8 MG/DL (ref 0.1–1.2)
BUN BLD-MCNC: 66 MG/DL (ref 8–23)
BUN/CREAT SERPL: 23.6 (ref 7–25)
CALCIUM SPEC-SCNC: 9.7 MG/DL (ref 8.6–10.5)
CHLORIDE SERPL-SCNC: 103 MMOL/L (ref 98–107)
CO2 SERPL-SCNC: 22.7 MMOL/L (ref 22–29)
CREAT BLD-MCNC: 2.8 MG/DL (ref 0.76–1.27)
GFR SERPL CREATININE-BSD FRML MDRD: 23 ML/MIN/1.73
GLOBULIN UR ELPH-MCNC: 2.7 GM/DL
GLUCOSE BLD-MCNC: 137 MG/DL (ref 65–99)
GLUCOSE BLDC GLUCOMTR-MCNC: 126 MG/DL (ref 70–130)
GLUCOSE BLDC GLUCOMTR-MCNC: 128 MG/DL (ref 70–130)
GLUCOSE BLDC GLUCOMTR-MCNC: 132 MG/DL (ref 70–130)
GLUCOSE BLDC GLUCOMTR-MCNC: 160 MG/DL (ref 70–130)
INR PPP: 3.85 (ref 0.9–1.1)
POTASSIUM BLD-SCNC: 3.8 MMOL/L (ref 3.5–5.2)
PROT SERPL-MCNC: 6.6 G/DL (ref 6–8.5)
PROTHROMBIN TIME: 36.7 SECONDS (ref 11.7–14.2)
SODIUM BLD-SCNC: 147 MMOL/L (ref 136–145)

## 2017-04-20 PROCEDURE — 92610 EVALUATE SWALLOWING FUNCTION: CPT

## 2017-04-20 PROCEDURE — 63710000001 INSULIN ASPART PER 5 UNITS: Performed by: INTERNAL MEDICINE

## 2017-04-20 PROCEDURE — 80053 COMPREHEN METABOLIC PANEL: CPT | Performed by: INTERNAL MEDICINE

## 2017-04-20 PROCEDURE — 25010000002 FUROSEMIDE PER 20 MG: Performed by: INTERNAL MEDICINE

## 2017-04-20 PROCEDURE — 97535 SELF CARE MNGMENT TRAINING: CPT | Performed by: OCCUPATIONAL THERAPIST

## 2017-04-20 PROCEDURE — 99231 SBSQ HOSP IP/OBS SF/LOW 25: CPT | Performed by: NURSE PRACTITIONER

## 2017-04-20 PROCEDURE — 85610 PROTHROMBIN TIME: CPT | Performed by: INTERNAL MEDICINE

## 2017-04-20 PROCEDURE — 97110 THERAPEUTIC EXERCISES: CPT

## 2017-04-20 PROCEDURE — 82962 GLUCOSE BLOOD TEST: CPT

## 2017-04-20 RX ORDER — FUROSEMIDE 10 MG/ML
40 INJECTION INTRAMUSCULAR; INTRAVENOUS EVERY 12 HOURS
Status: COMPLETED | OUTPATIENT
Start: 2017-04-20 | End: 2017-04-20

## 2017-04-20 RX ADMIN — INSULIN ASPART 2 UNITS: 100 INJECTION, SOLUTION INTRAVENOUS; SUBCUTANEOUS at 18:03

## 2017-04-20 RX ADMIN — CARVEDILOL 25 MG: 25 TABLET, FILM COATED ORAL at 18:03

## 2017-04-20 RX ADMIN — ASPIRIN 300 MG: 300 SUPPOSITORY RECTAL at 08:15

## 2017-04-20 RX ADMIN — FUROSEMIDE 40 MG: 10 INJECTION, SOLUTION INTRAMUSCULAR; INTRAVENOUS at 23:00

## 2017-04-20 RX ADMIN — ATORVASTATIN CALCIUM 40 MG: 80 TABLET, FILM COATED ORAL at 20:56

## 2017-04-20 RX ADMIN — FUROSEMIDE 40 MG: 10 INJECTION, SOLUTION INTRAMUSCULAR; INTRAVENOUS at 12:29

## 2017-04-21 LAB
ALBUMIN SERPL-MCNC: 3.6 G/DL (ref 3.5–5.2)
ALBUMIN/GLOB SERPL: 1.6 G/DL
ALP SERPL-CCNC: 88 U/L (ref 39–117)
ALT SERPL W P-5'-P-CCNC: 5 U/L (ref 1–41)
ANION GAP SERPL CALCULATED.3IONS-SCNC: 19.1 MMOL/L
AST SERPL-CCNC: 8 U/L (ref 1–40)
BILIRUB SERPL-MCNC: 0.9 MG/DL (ref 0.1–1.2)
BUN BLD-MCNC: 56 MG/DL (ref 8–23)
BUN/CREAT SERPL: 22.9 (ref 7–25)
CALCIUM SPEC-SCNC: 9.3 MG/DL (ref 8.6–10.5)
CHLORIDE SERPL-SCNC: 107 MMOL/L (ref 98–107)
CO2 SERPL-SCNC: 23.9 MMOL/L (ref 22–29)
CREAT BLD-MCNC: 2.45 MG/DL (ref 0.76–1.27)
GFR SERPL CREATININE-BSD FRML MDRD: 26 ML/MIN/1.73
GLOBULIN UR ELPH-MCNC: 2.2 GM/DL
GLUCOSE BLD-MCNC: 117 MG/DL (ref 65–99)
GLUCOSE BLDC GLUCOMTR-MCNC: 133 MG/DL (ref 70–130)
GLUCOSE BLDC GLUCOMTR-MCNC: 179 MG/DL (ref 70–130)
GLUCOSE BLDC GLUCOMTR-MCNC: 188 MG/DL (ref 70–130)
GLUCOSE BLDC GLUCOMTR-MCNC: 224 MG/DL (ref 70–130)
INR PPP: 3.23 (ref 0.9–1.1)
POTASSIUM BLD-SCNC: 3.4 MMOL/L (ref 3.5–5.2)
PROT SERPL-MCNC: 5.8 G/DL (ref 6–8.5)
PROTHROMBIN TIME: 32 SECONDS (ref 11.7–14.2)
SODIUM BLD-SCNC: 150 MMOL/L (ref 136–145)

## 2017-04-21 PROCEDURE — 99231 SBSQ HOSP IP/OBS SF/LOW 25: CPT | Performed by: NURSE PRACTITIONER

## 2017-04-21 PROCEDURE — 80053 COMPREHEN METABOLIC PANEL: CPT | Performed by: INTERNAL MEDICINE

## 2017-04-21 PROCEDURE — 82962 GLUCOSE BLOOD TEST: CPT

## 2017-04-21 PROCEDURE — 97110 THERAPEUTIC EXERCISES: CPT

## 2017-04-21 PROCEDURE — 63710000001 INSULIN ASPART PER 5 UNITS: Performed by: INTERNAL MEDICINE

## 2017-04-21 PROCEDURE — 85610 PROTHROMBIN TIME: CPT | Performed by: INTERNAL MEDICINE

## 2017-04-21 PROCEDURE — 97535 SELF CARE MNGMENT TRAINING: CPT | Performed by: OCCUPATIONAL THERAPIST

## 2017-04-21 PROCEDURE — 92526 ORAL FUNCTION THERAPY: CPT

## 2017-04-21 PROCEDURE — 92610 EVALUATE SWALLOWING FUNCTION: CPT

## 2017-04-21 RX ORDER — POTASSIUM CHLORIDE 29.8 MG/ML
20 INJECTION INTRAVENOUS ONCE
Status: DISCONTINUED | OUTPATIENT
Start: 2017-04-21 | End: 2017-04-21

## 2017-04-21 RX ORDER — SODIUM CHLORIDE 450 MG/100ML
60 INJECTION, SOLUTION INTRAVENOUS CONTINUOUS
Status: DISCONTINUED | OUTPATIENT
Start: 2017-04-21 | End: 2017-04-23

## 2017-04-21 RX ADMIN — ATORVASTATIN CALCIUM 40 MG: 80 TABLET, FILM COATED ORAL at 22:00

## 2017-04-21 RX ADMIN — CARVEDILOL 25 MG: 25 TABLET, FILM COATED ORAL at 09:31

## 2017-04-21 RX ADMIN — INSULIN ASPART 2 UNITS: 100 INJECTION, SOLUTION INTRAVENOUS; SUBCUTANEOUS at 18:38

## 2017-04-21 RX ADMIN — ASPIRIN 325 MG: 325 TABLET ORAL at 09:31

## 2017-04-21 RX ADMIN — SODIUM CHLORIDE 60 ML/HR: 4.5 INJECTION, SOLUTION INTRAVENOUS at 15:51

## 2017-04-21 RX ADMIN — INSULIN ASPART 2 UNITS: 100 INJECTION, SOLUTION INTRAVENOUS; SUBCUTANEOUS at 22:00

## 2017-04-21 RX ADMIN — CARVEDILOL 25 MG: 25 TABLET, FILM COATED ORAL at 18:38

## 2017-04-21 RX ADMIN — PANTOPRAZOLE SODIUM 40 MG: 40 TABLET, DELAYED RELEASE ORAL at 05:19

## 2017-04-22 ENCOUNTER — APPOINTMENT (OUTPATIENT)
Dept: GENERAL RADIOLOGY | Facility: HOSPITAL | Age: 67
End: 2017-04-22

## 2017-04-22 LAB
GLUCOSE BLDC GLUCOMTR-MCNC: 141 MG/DL (ref 70–130)
GLUCOSE BLDC GLUCOMTR-MCNC: 147 MG/DL (ref 70–130)
GLUCOSE BLDC GLUCOMTR-MCNC: 174 MG/DL (ref 70–130)
GLUCOSE BLDC GLUCOMTR-MCNC: 186 MG/DL (ref 70–130)
INR PPP: 2.63 (ref 0.9–1.1)
PROTHROMBIN TIME: 27.3 SECONDS (ref 11.7–14.2)

## 2017-04-22 PROCEDURE — 92611 MOTION FLUOROSCOPY/SWALLOW: CPT

## 2017-04-22 PROCEDURE — 82962 GLUCOSE BLOOD TEST: CPT

## 2017-04-22 PROCEDURE — 74230 X-RAY XM SWLNG FUNCJ C+: CPT

## 2017-04-22 PROCEDURE — 97530 THERAPEUTIC ACTIVITIES: CPT

## 2017-04-22 PROCEDURE — 85610 PROTHROMBIN TIME: CPT | Performed by: INTERNAL MEDICINE

## 2017-04-22 PROCEDURE — 63710000001 INSULIN ASPART PER 5 UNITS: Performed by: INTERNAL MEDICINE

## 2017-04-22 RX ORDER — CARVEDILOL 12.5 MG/1
12.5 TABLET ORAL 2 TIMES DAILY WITH MEALS
Status: DISCONTINUED | OUTPATIENT
Start: 2017-04-22 | End: 2017-04-24

## 2017-04-22 RX ADMIN — ASPIRIN 325 MG: 325 TABLET ORAL at 09:50

## 2017-04-22 RX ADMIN — CARVEDILOL 25 MG: 25 TABLET, FILM COATED ORAL at 09:50

## 2017-04-22 RX ADMIN — INSULIN ASPART 2 UNITS: 100 INJECTION, SOLUTION INTRAVENOUS; SUBCUTANEOUS at 18:19

## 2017-04-22 RX ADMIN — PANTOPRAZOLE SODIUM 40 MG: 40 TABLET, DELAYED RELEASE ORAL at 05:16

## 2017-04-22 RX ADMIN — INSULIN ASPART 2 UNITS: 100 INJECTION, SOLUTION INTRAVENOUS; SUBCUTANEOUS at 13:30

## 2017-04-22 RX ADMIN — WARFARIN SODIUM 4 MG: 4 TABLET ORAL at 18:20

## 2017-04-22 RX ADMIN — CARVEDILOL 12.5 MG: 12.5 TABLET, FILM COATED ORAL at 22:11

## 2017-04-22 RX ADMIN — ATORVASTATIN CALCIUM 40 MG: 80 TABLET, FILM COATED ORAL at 22:11

## 2017-04-23 LAB
ALBUMIN SERPL-MCNC: 3.4 G/DL (ref 3.5–5.2)
ANION GAP SERPL CALCULATED.3IONS-SCNC: 14.7 MMOL/L
BUN BLD-MCNC: 42 MG/DL (ref 8–23)
BUN/CREAT SERPL: 22.1 (ref 7–25)
CALCIUM SPEC-SCNC: 9 MG/DL (ref 8.6–10.5)
CHLORIDE SERPL-SCNC: 108 MMOL/L (ref 98–107)
CO2 SERPL-SCNC: 25.3 MMOL/L (ref 22–29)
CREAT BLD-MCNC: 1.9 MG/DL (ref 0.76–1.27)
GFR SERPL CREATININE-BSD FRML MDRD: 35 ML/MIN/1.73
GLUCOSE BLD-MCNC: 142 MG/DL (ref 65–99)
GLUCOSE BLDC GLUCOMTR-MCNC: 123 MG/DL (ref 70–130)
GLUCOSE BLDC GLUCOMTR-MCNC: 155 MG/DL (ref 70–130)
GLUCOSE BLDC GLUCOMTR-MCNC: 192 MG/DL (ref 70–130)
GLUCOSE BLDC GLUCOMTR-MCNC: 199 MG/DL (ref 70–130)
INR PPP: 2.36 (ref 0.9–1.1)
MAGNESIUM SERPL-MCNC: 1.8 MG/DL (ref 1.6–2.4)
PHOSPHATE SERPL-MCNC: 2.5 MG/DL (ref 2.5–4.5)
POTASSIUM BLD-SCNC: 3.3 MMOL/L (ref 3.5–5.2)
PROTHROMBIN TIME: 25 SECONDS (ref 11.7–14.2)
SODIUM BLD-SCNC: 148 MMOL/L (ref 136–145)

## 2017-04-23 PROCEDURE — 63710000001 INSULIN ASPART PER 5 UNITS: Performed by: INTERNAL MEDICINE

## 2017-04-23 PROCEDURE — 82962 GLUCOSE BLOOD TEST: CPT

## 2017-04-23 PROCEDURE — 25010000002 HALOPERIDOL LACTATE PER 5 MG: Performed by: NURSE PRACTITIONER

## 2017-04-23 PROCEDURE — 85610 PROTHROMBIN TIME: CPT | Performed by: INTERNAL MEDICINE

## 2017-04-23 PROCEDURE — 80069 RENAL FUNCTION PANEL: CPT | Performed by: INTERNAL MEDICINE

## 2017-04-23 PROCEDURE — 83735 ASSAY OF MAGNESIUM: CPT | Performed by: INTERNAL MEDICINE

## 2017-04-23 PROCEDURE — 25810000003 DEXTROSE 5 % WITH KCL 20 MEQ 20-5 MEQ/L-% SOLUTION: Performed by: INTERNAL MEDICINE

## 2017-04-23 PROCEDURE — 97530 THERAPEUTIC ACTIVITIES: CPT

## 2017-04-23 RX ORDER — POTASSIUM CHLORIDE, DEXTROSE MONOHYDRATE 150; 5 MG/100ML; G/100ML
50 INJECTION, SOLUTION INTRAVENOUS CONTINUOUS
Status: DISCONTINUED | OUTPATIENT
Start: 2017-04-23 | End: 2017-04-24

## 2017-04-23 RX ORDER — POTASSIUM CHLORIDE 1.5 G/1.77G
20 POWDER, FOR SOLUTION ORAL ONCE
Status: COMPLETED | OUTPATIENT
Start: 2017-04-23 | End: 2017-04-23

## 2017-04-23 RX ADMIN — POTASSIUM CHLORIDE 20 MEQ: 1.5 POWDER, FOR SOLUTION ORAL at 13:12

## 2017-04-23 RX ADMIN — WARFARIN SODIUM 4 MG: 4 TABLET ORAL at 18:04

## 2017-04-23 RX ADMIN — INSULIN ASPART 2 UNITS: 100 INJECTION, SOLUTION INTRAVENOUS; SUBCUTANEOUS at 18:04

## 2017-04-23 RX ADMIN — ASPIRIN 325 MG: 325 TABLET ORAL at 09:53

## 2017-04-23 RX ADMIN — PANTOPRAZOLE SODIUM 40 MG: 40 TABLET, DELAYED RELEASE ORAL at 06:10

## 2017-04-23 RX ADMIN — HALOPERIDOL LACTATE 1 MG: 5 INJECTION, SOLUTION INTRAMUSCULAR at 02:57

## 2017-04-23 RX ADMIN — INSULIN ASPART 2 UNITS: 100 INJECTION, SOLUTION INTRAVENOUS; SUBCUTANEOUS at 11:53

## 2017-04-23 RX ADMIN — ATORVASTATIN CALCIUM 40 MG: 80 TABLET, FILM COATED ORAL at 23:55

## 2017-04-23 RX ADMIN — CARVEDILOL 12.5 MG: 12.5 TABLET, FILM COATED ORAL at 09:53

## 2017-04-23 RX ADMIN — INSULIN ASPART 2 UNITS: 100 INJECTION, SOLUTION INTRAVENOUS; SUBCUTANEOUS at 20:45

## 2017-04-23 RX ADMIN — POTASSIUM CHLORIDE AND DEXTROSE MONOHYDRATE 50 ML/HR: 150; 5 INJECTION, SOLUTION INTRAVENOUS at 13:12

## 2017-04-24 LAB
ANION GAP SERPL CALCULATED.3IONS-SCNC: 14 MMOL/L
BUN BLD-MCNC: 40 MG/DL (ref 8–23)
BUN/CREAT SERPL: 22.9 (ref 7–25)
CALCIUM SPEC-SCNC: 8.9 MG/DL (ref 8.6–10.5)
CHLORIDE SERPL-SCNC: 106 MMOL/L (ref 98–107)
CO2 SERPL-SCNC: 25 MMOL/L (ref 22–29)
CREAT BLD-MCNC: 1.75 MG/DL (ref 0.76–1.27)
GFR SERPL CREATININE-BSD FRML MDRD: 39 ML/MIN/1.73
GLUCOSE BLD-MCNC: 162 MG/DL (ref 65–99)
GLUCOSE BLDC GLUCOMTR-MCNC: 131 MG/DL (ref 70–130)
GLUCOSE BLDC GLUCOMTR-MCNC: 137 MG/DL (ref 70–130)
GLUCOSE BLDC GLUCOMTR-MCNC: 174 MG/DL (ref 70–130)
GLUCOSE BLDC GLUCOMTR-MCNC: 186 MG/DL (ref 70–130)
INR PPP: 2.3 (ref 0.9–1.1)
POTASSIUM BLD-SCNC: 3.5 MMOL/L (ref 3.5–5.2)
PROTHROMBIN TIME: 24.5 SECONDS (ref 11.7–14.2)
SODIUM BLD-SCNC: 145 MMOL/L (ref 136–145)

## 2017-04-24 PROCEDURE — 85610 PROTHROMBIN TIME: CPT | Performed by: INTERNAL MEDICINE

## 2017-04-24 PROCEDURE — 80048 BASIC METABOLIC PNL TOTAL CA: CPT | Performed by: INTERNAL MEDICINE

## 2017-04-24 PROCEDURE — 82962 GLUCOSE BLOOD TEST: CPT

## 2017-04-24 PROCEDURE — 92610 EVALUATE SWALLOWING FUNCTION: CPT

## 2017-04-24 PROCEDURE — 97535 SELF CARE MNGMENT TRAINING: CPT | Performed by: OCCUPATIONAL THERAPIST

## 2017-04-24 PROCEDURE — 97110 THERAPEUTIC EXERCISES: CPT

## 2017-04-24 PROCEDURE — 97110 THERAPEUTIC EXERCISES: CPT | Performed by: OCCUPATIONAL THERAPIST

## 2017-04-24 PROCEDURE — 63710000001 INSULIN ASPART PER 5 UNITS: Performed by: INTERNAL MEDICINE

## 2017-04-24 RX ORDER — AMLODIPINE BESYLATE 5 MG/1
5 TABLET ORAL ONCE
Status: DISCONTINUED | OUTPATIENT
Start: 2017-04-24 | End: 2017-04-29

## 2017-04-24 RX ORDER — AMLODIPINE BESYLATE 5 MG/1
5 TABLET ORAL
Status: DISCONTINUED | OUTPATIENT
Start: 2017-04-24 | End: 2017-04-29

## 2017-04-24 RX ADMIN — INSULIN ASPART 2 UNITS: 100 INJECTION, SOLUTION INTRAVENOUS; SUBCUTANEOUS at 12:11

## 2017-04-24 RX ADMIN — WARFARIN SODIUM 4 MG: 4 TABLET ORAL at 18:55

## 2017-04-24 RX ADMIN — AMLODIPINE BESYLATE 5 MG: 5 TABLET ORAL at 11:12

## 2017-04-24 RX ADMIN — INSULIN ASPART 2 UNITS: 100 INJECTION, SOLUTION INTRAVENOUS; SUBCUTANEOUS at 22:55

## 2017-04-24 RX ADMIN — PANTOPRAZOLE SODIUM 40 MG: 40 TABLET, DELAYED RELEASE ORAL at 07:45

## 2017-04-24 RX ADMIN — ATORVASTATIN CALCIUM 40 MG: 80 TABLET, FILM COATED ORAL at 22:55

## 2017-04-24 RX ADMIN — ASPIRIN 325 MG: 325 TABLET ORAL at 11:12

## 2017-04-25 LAB
GLUCOSE BLDC GLUCOMTR-MCNC: 148 MG/DL (ref 70–130)
GLUCOSE BLDC GLUCOMTR-MCNC: 166 MG/DL (ref 70–130)
GLUCOSE BLDC GLUCOMTR-MCNC: 200 MG/DL (ref 70–130)
GLUCOSE BLDC GLUCOMTR-MCNC: 202 MG/DL (ref 70–130)
GLUCOSE BLDC GLUCOMTR-MCNC: 204 MG/DL (ref 70–130)

## 2017-04-25 PROCEDURE — 82962 GLUCOSE BLOOD TEST: CPT

## 2017-04-25 PROCEDURE — 92610 EVALUATE SWALLOWING FUNCTION: CPT

## 2017-04-25 PROCEDURE — 63710000001 INSULIN ASPART PER 5 UNITS: Performed by: INTERNAL MEDICINE

## 2017-04-25 PROCEDURE — 97535 SELF CARE MNGMENT TRAINING: CPT | Performed by: OCCUPATIONAL THERAPIST

## 2017-04-25 PROCEDURE — 97110 THERAPEUTIC EXERCISES: CPT

## 2017-04-25 RX ADMIN — INSULIN ASPART 2 UNITS: 100 INJECTION, SOLUTION INTRAVENOUS; SUBCUTANEOUS at 09:39

## 2017-04-25 RX ADMIN — INSULIN ASPART 4 UNITS: 100 INJECTION, SOLUTION INTRAVENOUS; SUBCUTANEOUS at 12:37

## 2017-04-25 RX ADMIN — ATORVASTATIN CALCIUM 40 MG: 80 TABLET, FILM COATED ORAL at 22:40

## 2017-04-25 RX ADMIN — ASPIRIN 325 MG: 325 TABLET ORAL at 09:39

## 2017-04-25 RX ADMIN — AMLODIPINE BESYLATE 5 MG: 5 TABLET ORAL at 09:39

## 2017-04-25 RX ADMIN — PANTOPRAZOLE SODIUM 40 MG: 40 TABLET, DELAYED RELEASE ORAL at 10:39

## 2017-04-25 RX ADMIN — INSULIN ASPART 4 UNITS: 100 INJECTION, SOLUTION INTRAVENOUS; SUBCUTANEOUS at 22:40

## 2017-04-25 RX ADMIN — WARFARIN SODIUM 4 MG: 4 TABLET ORAL at 18:14

## 2017-04-26 LAB
ANION GAP SERPL CALCULATED.3IONS-SCNC: 15.1 MMOL/L
BUN BLD-MCNC: 40 MG/DL (ref 8–23)
BUN/CREAT SERPL: 21.4 (ref 7–25)
CALCIUM SPEC-SCNC: 9 MG/DL (ref 8.6–10.5)
CHLORIDE SERPL-SCNC: 107 MMOL/L (ref 98–107)
CO2 SERPL-SCNC: 23.9 MMOL/L (ref 22–29)
CREAT BLD-MCNC: 1.87 MG/DL (ref 0.76–1.27)
GFR SERPL CREATININE-BSD FRML MDRD: 36 ML/MIN/1.73
GLUCOSE BLD-MCNC: 162 MG/DL (ref 65–99)
GLUCOSE BLDC GLUCOMTR-MCNC: 142 MG/DL (ref 70–130)
GLUCOSE BLDC GLUCOMTR-MCNC: 168 MG/DL (ref 70–130)
GLUCOSE BLDC GLUCOMTR-MCNC: 202 MG/DL (ref 70–130)
GLUCOSE BLDC GLUCOMTR-MCNC: 213 MG/DL (ref 70–130)
INR PPP: 2.31 (ref 0.9–1.1)
POTASSIUM BLD-SCNC: 3.5 MMOL/L (ref 3.5–5.2)
PROTHROMBIN TIME: 24.6 SECONDS (ref 11.7–14.2)
SODIUM BLD-SCNC: 146 MMOL/L (ref 136–145)

## 2017-04-26 PROCEDURE — 85610 PROTHROMBIN TIME: CPT | Performed by: INTERNAL MEDICINE

## 2017-04-26 PROCEDURE — 97110 THERAPEUTIC EXERCISES: CPT

## 2017-04-26 PROCEDURE — 63710000001 INSULIN ASPART PER 5 UNITS: Performed by: INTERNAL MEDICINE

## 2017-04-26 PROCEDURE — 80048 BASIC METABOLIC PNL TOTAL CA: CPT | Performed by: INTERNAL MEDICINE

## 2017-04-26 PROCEDURE — 97535 SELF CARE MNGMENT TRAINING: CPT | Performed by: OCCUPATIONAL THERAPIST

## 2017-04-26 PROCEDURE — 82962 GLUCOSE BLOOD TEST: CPT

## 2017-04-26 RX ORDER — ROSUVASTATIN CALCIUM 20 MG/1
20 TABLET, COATED ORAL NIGHTLY
Status: DISCONTINUED | OUTPATIENT
Start: 2017-04-26 | End: 2017-05-02 | Stop reason: HOSPADM

## 2017-04-26 RX ORDER — SODIUM CHLORIDE 450 MG/100ML
50 INJECTION, SOLUTION INTRAVENOUS CONTINUOUS
Status: DISCONTINUED | OUTPATIENT
Start: 2017-04-26 | End: 2017-04-27

## 2017-04-26 RX ADMIN — SODIUM CHLORIDE 50 ML/HR: 4.5 INJECTION, SOLUTION INTRAVENOUS at 11:41

## 2017-04-26 RX ADMIN — ROSUVASTATIN CALCIUM 20 MG: 20 TABLET, FILM COATED ORAL at 22:10

## 2017-04-26 RX ADMIN — INSULIN ASPART 4 UNITS: 100 INJECTION, SOLUTION INTRAVENOUS; SUBCUTANEOUS at 22:20

## 2017-04-26 RX ADMIN — WARFARIN SODIUM 4 MG: 4 TABLET ORAL at 18:33

## 2017-04-26 RX ADMIN — ASPIRIN 325 MG: 325 TABLET ORAL at 09:41

## 2017-04-26 RX ADMIN — INSULIN ASPART 2 UNITS: 100 INJECTION, SOLUTION INTRAVENOUS; SUBCUTANEOUS at 12:07

## 2017-04-26 RX ADMIN — INSULIN ASPART 4 UNITS: 100 INJECTION, SOLUTION INTRAVENOUS; SUBCUTANEOUS at 18:32

## 2017-04-26 RX ADMIN — AMLODIPINE BESYLATE 5 MG: 5 TABLET ORAL at 09:41

## 2017-04-26 RX ADMIN — PANTOPRAZOLE SODIUM 40 MG: 40 TABLET, DELAYED RELEASE ORAL at 06:26

## 2017-04-27 LAB
ANION GAP SERPL CALCULATED.3IONS-SCNC: 17.3 MMOL/L
BUN BLD-MCNC: 37 MG/DL (ref 8–23)
BUN/CREAT SERPL: 19.3 (ref 7–25)
CALCIUM SPEC-SCNC: 9.1 MG/DL (ref 8.6–10.5)
CHLORIDE SERPL-SCNC: 106 MMOL/L (ref 98–107)
CO2 SERPL-SCNC: 22.7 MMOL/L (ref 22–29)
CREAT BLD-MCNC: 1.92 MG/DL (ref 0.76–1.27)
GFR SERPL CREATININE-BSD FRML MDRD: 35 ML/MIN/1.73
GLUCOSE BLD-MCNC: 157 MG/DL (ref 65–99)
GLUCOSE BLDC GLUCOMTR-MCNC: 133 MG/DL (ref 70–130)
GLUCOSE BLDC GLUCOMTR-MCNC: 170 MG/DL (ref 70–130)
GLUCOSE BLDC GLUCOMTR-MCNC: 191 MG/DL (ref 70–130)
GLUCOSE BLDC GLUCOMTR-MCNC: 200 MG/DL (ref 70–130)
INR PPP: 2.18 (ref 0.9–1.1)
MAGNESIUM SERPL-MCNC: 1.7 MG/DL (ref 1.6–2.4)
POTASSIUM BLD-SCNC: 3.6 MMOL/L (ref 3.5–5.2)
PROTHROMBIN TIME: 23.6 SECONDS (ref 11.7–14.2)
SODIUM BLD-SCNC: 146 MMOL/L (ref 136–145)

## 2017-04-27 PROCEDURE — 80048 BASIC METABOLIC PNL TOTAL CA: CPT | Performed by: INTERNAL MEDICINE

## 2017-04-27 PROCEDURE — 92523 SPEECH SOUND LANG COMPREHEN: CPT

## 2017-04-27 PROCEDURE — 97110 THERAPEUTIC EXERCISES: CPT | Performed by: OCCUPATIONAL THERAPIST

## 2017-04-27 PROCEDURE — 25010000003 POTASSIUM CHLORIDE 10 MEQ/100ML SOLUTION: Performed by: INTERNAL MEDICINE

## 2017-04-27 PROCEDURE — 85610 PROTHROMBIN TIME: CPT | Performed by: INTERNAL MEDICINE

## 2017-04-27 PROCEDURE — 97110 THERAPEUTIC EXERCISES: CPT

## 2017-04-27 PROCEDURE — 83735 ASSAY OF MAGNESIUM: CPT | Performed by: INTERNAL MEDICINE

## 2017-04-27 PROCEDURE — 97535 SELF CARE MNGMENT TRAINING: CPT | Performed by: OCCUPATIONAL THERAPIST

## 2017-04-27 PROCEDURE — 82962 GLUCOSE BLOOD TEST: CPT

## 2017-04-27 PROCEDURE — 63710000001 INSULIN ASPART PER 5 UNITS: Performed by: INTERNAL MEDICINE

## 2017-04-27 RX ORDER — POTASSIUM CHLORIDE 7.45 MG/ML
10 INJECTION INTRAVENOUS
Status: COMPLETED | OUTPATIENT
Start: 2017-04-27 | End: 2017-04-27

## 2017-04-27 RX ORDER — POTASSIUM CHLORIDE 1.5 G/1.77G
40 POWDER, FOR SOLUTION ORAL ONCE
Status: COMPLETED | OUTPATIENT
Start: 2017-04-27 | End: 2017-04-27

## 2017-04-27 RX ORDER — POTASSIUM CHLORIDE 750 MG/1
40 CAPSULE, EXTENDED RELEASE ORAL ONCE
Status: DISCONTINUED | OUTPATIENT
Start: 2017-04-27 | End: 2017-04-27

## 2017-04-27 RX ORDER — DEXTROSE MONOHYDRATE 50 MG/ML
50 INJECTION, SOLUTION INTRAVENOUS CONTINUOUS
Status: DISCONTINUED | OUTPATIENT
Start: 2017-04-27 | End: 2017-04-28

## 2017-04-27 RX ORDER — MAGNESIUM OXIDE 400 MG/1
400 TABLET ORAL ONCE
Status: COMPLETED | OUTPATIENT
Start: 2017-04-27 | End: 2017-04-27

## 2017-04-27 RX ADMIN — DEXTROSE MONOHYDRATE 50 ML/HR: 50 INJECTION, SOLUTION INTRAVENOUS at 12:33

## 2017-04-27 RX ADMIN — AMLODIPINE BESYLATE 5 MG: 5 TABLET ORAL at 10:02

## 2017-04-27 RX ADMIN — PANTOPRAZOLE SODIUM 40 MG: 40 TABLET, DELAYED RELEASE ORAL at 05:30

## 2017-04-27 RX ADMIN — POTASSIUM CHLORIDE 10 MEQ: 10 INJECTION, SOLUTION INTRAVENOUS at 20:17

## 2017-04-27 RX ADMIN — Medication 400 MG: at 19:02

## 2017-04-27 RX ADMIN — WARFARIN SODIUM 4 MG: 4 TABLET ORAL at 18:09

## 2017-04-27 RX ADMIN — INSULIN ASPART 2 UNITS: 100 INJECTION, SOLUTION INTRAVENOUS; SUBCUTANEOUS at 18:09

## 2017-04-27 RX ADMIN — INSULIN ASPART 2 UNITS: 100 INJECTION, SOLUTION INTRAVENOUS; SUBCUTANEOUS at 10:10

## 2017-04-27 RX ADMIN — POTASSIUM CHLORIDE 40 MEQ: 1.5 POWDER, FOR SOLUTION ORAL at 19:02

## 2017-04-27 RX ADMIN — POTASSIUM CHLORIDE 10 MEQ: 10 INJECTION, SOLUTION INTRAVENOUS at 22:10

## 2017-04-27 RX ADMIN — ROSUVASTATIN CALCIUM 20 MG: 20 TABLET, FILM COATED ORAL at 22:05

## 2017-04-27 RX ADMIN — ASPIRIN 325 MG: 325 TABLET ORAL at 10:04

## 2017-04-27 RX ADMIN — INSULIN ASPART 4 UNITS: 100 INJECTION, SOLUTION INTRAVENOUS; SUBCUTANEOUS at 22:05

## 2017-04-28 LAB
ALBUMIN SERPL-MCNC: 3.3 G/DL (ref 3.5–5.2)
ALBUMIN/GLOB SERPL: 1.4 G/DL
ALP SERPL-CCNC: 100 U/L (ref 39–117)
ALT SERPL W P-5'-P-CCNC: 9 U/L (ref 1–41)
ANION GAP SERPL CALCULATED.3IONS-SCNC: 11 MMOL/L
AST SERPL-CCNC: 9 U/L (ref 1–40)
BILIRUB SERPL-MCNC: 0.9 MG/DL (ref 0.1–1.2)
BUN BLD-MCNC: 35 MG/DL (ref 8–23)
BUN/CREAT SERPL: 19 (ref 7–25)
CALCIUM SPEC-SCNC: 8.6 MG/DL (ref 8.6–10.5)
CHLORIDE SERPL-SCNC: 108 MMOL/L (ref 98–107)
CO2 SERPL-SCNC: 25 MMOL/L (ref 22–29)
CREAT BLD-MCNC: 1.84 MG/DL (ref 0.76–1.27)
GFR SERPL CREATININE-BSD FRML MDRD: 37 ML/MIN/1.73
GLOBULIN UR ELPH-MCNC: 2.4 GM/DL
GLUCOSE BLD-MCNC: 172 MG/DL (ref 65–99)
GLUCOSE BLDC GLUCOMTR-MCNC: 152 MG/DL (ref 70–130)
GLUCOSE BLDC GLUCOMTR-MCNC: 163 MG/DL (ref 70–130)
GLUCOSE BLDC GLUCOMTR-MCNC: 164 MG/DL (ref 70–130)
GLUCOSE BLDC GLUCOMTR-MCNC: 188 MG/DL (ref 70–130)
INR PPP: 2.18 (ref 0.9–1.1)
MAGNESIUM SERPL-MCNC: 1.9 MG/DL (ref 1.6–2.4)
POTASSIUM BLD-SCNC: 3.8 MMOL/L (ref 3.5–5.2)
PROT SERPL-MCNC: 5.7 G/DL (ref 6–8.5)
PROTHROMBIN TIME: 23.5 SECONDS (ref 11.7–14.2)
SODIUM BLD-SCNC: 144 MMOL/L (ref 136–145)

## 2017-04-28 PROCEDURE — 82962 GLUCOSE BLOOD TEST: CPT

## 2017-04-28 PROCEDURE — 85610 PROTHROMBIN TIME: CPT | Performed by: INTERNAL MEDICINE

## 2017-04-28 PROCEDURE — 63710000001 INSULIN ASPART PER 5 UNITS: Performed by: INTERNAL MEDICINE

## 2017-04-28 PROCEDURE — 97110 THERAPEUTIC EXERCISES: CPT | Performed by: OCCUPATIONAL THERAPIST

## 2017-04-28 PROCEDURE — 80053 COMPREHEN METABOLIC PANEL: CPT | Performed by: INTERNAL MEDICINE

## 2017-04-28 PROCEDURE — 83735 ASSAY OF MAGNESIUM: CPT | Performed by: INTERNAL MEDICINE

## 2017-04-28 PROCEDURE — 97110 THERAPEUTIC EXERCISES: CPT

## 2017-04-28 RX ORDER — TORSEMIDE 20 MG/1
20 TABLET ORAL DAILY
Status: DISCONTINUED | OUTPATIENT
Start: 2017-04-28 | End: 2017-04-29

## 2017-04-28 RX ADMIN — AMLODIPINE BESYLATE 5 MG: 5 TABLET ORAL at 17:31

## 2017-04-28 RX ADMIN — INSULIN ASPART 2 UNITS: 100 INJECTION, SOLUTION INTRAVENOUS; SUBCUTANEOUS at 17:30

## 2017-04-28 RX ADMIN — INSULIN ASPART 2 UNITS: 100 INJECTION, SOLUTION INTRAVENOUS; SUBCUTANEOUS at 12:09

## 2017-04-28 RX ADMIN — ROSUVASTATIN CALCIUM 20 MG: 20 TABLET, FILM COATED ORAL at 23:00

## 2017-04-28 RX ADMIN — WARFARIN SODIUM 4 MG: 4 TABLET ORAL at 17:30

## 2017-04-28 RX ADMIN — PANTOPRAZOLE SODIUM 40 MG: 40 TABLET, DELAYED RELEASE ORAL at 06:00

## 2017-04-28 RX ADMIN — INSULIN ASPART 2 UNITS: 100 INJECTION, SOLUTION INTRAVENOUS; SUBCUTANEOUS at 08:20

## 2017-04-28 RX ADMIN — ASPIRIN 325 MG: 325 TABLET ORAL at 08:27

## 2017-04-28 RX ADMIN — TORSEMIDE 20 MG: 20 TABLET ORAL at 13:23

## 2017-04-29 LAB
GLUCOSE BLDC GLUCOMTR-MCNC: 180 MG/DL (ref 70–130)
GLUCOSE BLDC GLUCOMTR-MCNC: 203 MG/DL (ref 70–130)
GLUCOSE BLDC GLUCOMTR-MCNC: 205 MG/DL (ref 70–130)
INR PPP: 2.23 (ref 0.9–1.1)
PROTHROMBIN TIME: 23.9 SECONDS (ref 11.7–14.2)

## 2017-04-29 PROCEDURE — 97530 THERAPEUTIC ACTIVITIES: CPT

## 2017-04-29 PROCEDURE — 82962 GLUCOSE BLOOD TEST: CPT

## 2017-04-29 PROCEDURE — 63710000001 INSULIN ASPART PER 5 UNITS: Performed by: INTERNAL MEDICINE

## 2017-04-29 PROCEDURE — 85610 PROTHROMBIN TIME: CPT | Performed by: INTERNAL MEDICINE

## 2017-04-29 RX ADMIN — TORSEMIDE 20 MG: 20 TABLET ORAL at 08:37

## 2017-04-29 RX ADMIN — AMLODIPINE BESYLATE 5 MG: 5 TABLET ORAL at 08:37

## 2017-04-29 RX ADMIN — INSULIN ASPART 4 UNITS: 100 INJECTION, SOLUTION INTRAVENOUS; SUBCUTANEOUS at 18:34

## 2017-04-29 RX ADMIN — PANTOPRAZOLE SODIUM 40 MG: 40 TABLET, DELAYED RELEASE ORAL at 06:30

## 2017-04-29 RX ADMIN — ASPIRIN 325 MG: 325 TABLET ORAL at 08:37

## 2017-04-29 RX ADMIN — ROSUVASTATIN CALCIUM 20 MG: 20 TABLET, FILM COATED ORAL at 21:00

## 2017-04-29 RX ADMIN — WARFARIN SODIUM 4 MG: 4 TABLET ORAL at 17:22

## 2017-04-29 RX ADMIN — SODIUM CHLORIDE 500 ML: 9 INJECTION, SOLUTION INTRAVENOUS at 20:45

## 2017-04-30 LAB
ANION GAP SERPL CALCULATED.3IONS-SCNC: 14.5 MMOL/L
BUN BLD-MCNC: 33 MG/DL (ref 8–23)
BUN/CREAT SERPL: 18.4 (ref 7–25)
CALCIUM SPEC-SCNC: 8.6 MG/DL (ref 8.6–10.5)
CHLORIDE SERPL-SCNC: 108 MMOL/L (ref 98–107)
CO2 SERPL-SCNC: 23.5 MMOL/L (ref 22–29)
CREAT BLD-MCNC: 1.79 MG/DL (ref 0.76–1.27)
GFR SERPL CREATININE-BSD FRML MDRD: 38 ML/MIN/1.73
GLUCOSE BLD-MCNC: 166 MG/DL (ref 65–99)
GLUCOSE BLDC GLUCOMTR-MCNC: 146 MG/DL (ref 70–130)
GLUCOSE BLDC GLUCOMTR-MCNC: 181 MG/DL (ref 70–130)
GLUCOSE BLDC GLUCOMTR-MCNC: 205 MG/DL (ref 70–130)
GLUCOSE BLDC GLUCOMTR-MCNC: 308 MG/DL (ref 70–130)
INR PPP: 2.3 (ref 0.9–1.1)
POTASSIUM BLD-SCNC: 3.9 MMOL/L (ref 3.5–5.2)
PROTHROMBIN TIME: 24.5 SECONDS (ref 11.7–14.2)
SODIUM BLD-SCNC: 146 MMOL/L (ref 136–145)

## 2017-04-30 PROCEDURE — 80048 BASIC METABOLIC PNL TOTAL CA: CPT | Performed by: INTERNAL MEDICINE

## 2017-04-30 PROCEDURE — 82962 GLUCOSE BLOOD TEST: CPT

## 2017-04-30 PROCEDURE — 63710000001 INSULIN ASPART PER 5 UNITS: Performed by: INTERNAL MEDICINE

## 2017-04-30 PROCEDURE — 97110 THERAPEUTIC EXERCISES: CPT

## 2017-04-30 PROCEDURE — 85610 PROTHROMBIN TIME: CPT | Performed by: INTERNAL MEDICINE

## 2017-04-30 RX ADMIN — ASPIRIN 325 MG: 325 TABLET ORAL at 08:49

## 2017-04-30 RX ADMIN — INSULIN ASPART 2 UNITS: 100 INJECTION, SOLUTION INTRAVENOUS; SUBCUTANEOUS at 12:48

## 2017-04-30 RX ADMIN — PANTOPRAZOLE SODIUM 40 MG: 40 TABLET, DELAYED RELEASE ORAL at 05:41

## 2017-04-30 RX ADMIN — ROSUVASTATIN CALCIUM 20 MG: 20 TABLET, FILM COATED ORAL at 20:30

## 2017-04-30 RX ADMIN — INSULIN ASPART 7 UNITS: 100 INJECTION, SOLUTION INTRAVENOUS; SUBCUTANEOUS at 17:24

## 2017-04-30 RX ADMIN — WARFARIN SODIUM 4 MG: 4 TABLET ORAL at 17:24

## 2017-05-01 LAB
GLUCOSE BLDC GLUCOMTR-MCNC: 153 MG/DL (ref 70–130)
GLUCOSE BLDC GLUCOMTR-MCNC: 162 MG/DL (ref 70–130)
GLUCOSE BLDC GLUCOMTR-MCNC: 175 MG/DL (ref 70–130)
GLUCOSE BLDC GLUCOMTR-MCNC: 232 MG/DL (ref 70–130)

## 2017-05-01 PROCEDURE — 97532: CPT

## 2017-05-01 PROCEDURE — 97112 NEUROMUSCULAR REEDUCATION: CPT | Performed by: OCCUPATIONAL THERAPIST

## 2017-05-01 PROCEDURE — 97110 THERAPEUTIC EXERCISES: CPT | Performed by: OCCUPATIONAL THERAPIST

## 2017-05-01 PROCEDURE — 97110 THERAPEUTIC EXERCISES: CPT

## 2017-05-01 PROCEDURE — 82962 GLUCOSE BLOOD TEST: CPT

## 2017-05-01 PROCEDURE — 63710000001 INSULIN ASPART PER 5 UNITS: Performed by: INTERNAL MEDICINE

## 2017-05-01 PROCEDURE — 92610 EVALUATE SWALLOWING FUNCTION: CPT

## 2017-05-01 RX ADMIN — ROSUVASTATIN CALCIUM 20 MG: 20 TABLET, FILM COATED ORAL at 20:48

## 2017-05-01 RX ADMIN — INSULIN ASPART 4 UNITS: 100 INJECTION, SOLUTION INTRAVENOUS; SUBCUTANEOUS at 20:53

## 2017-05-01 RX ADMIN — INSULIN ASPART 2 UNITS: 100 INJECTION, SOLUTION INTRAVENOUS; SUBCUTANEOUS at 08:19

## 2017-05-01 RX ADMIN — ASPIRIN 325 MG: 325 TABLET ORAL at 08:19

## 2017-05-01 RX ADMIN — INSULIN ASPART 2 UNITS: 100 INJECTION, SOLUTION INTRAVENOUS; SUBCUTANEOUS at 12:32

## 2017-05-01 RX ADMIN — WARFARIN SODIUM 4 MG: 4 TABLET ORAL at 17:11

## 2017-05-01 RX ADMIN — INSULIN ASPART 2 UNITS: 100 INJECTION, SOLUTION INTRAVENOUS; SUBCUTANEOUS at 17:10

## 2017-05-01 RX ADMIN — PANTOPRAZOLE SODIUM 40 MG: 40 TABLET, DELAYED RELEASE ORAL at 05:26

## 2017-05-02 VITALS
WEIGHT: 238.8 LBS | RESPIRATION RATE: 20 BRPM | HEART RATE: 70 BPM | OXYGEN SATURATION: 94 % | TEMPERATURE: 97.9 F | SYSTOLIC BLOOD PRESSURE: 145 MMHG | HEIGHT: 67 IN | BODY MASS INDEX: 37.48 KG/M2 | DIASTOLIC BLOOD PRESSURE: 79 MMHG

## 2017-05-02 LAB
GLUCOSE BLDC GLUCOMTR-MCNC: 153 MG/DL (ref 70–130)
GLUCOSE BLDC GLUCOMTR-MCNC: 174 MG/DL (ref 70–130)

## 2017-05-02 PROCEDURE — 82962 GLUCOSE BLOOD TEST: CPT

## 2017-05-02 PROCEDURE — 63710000001 INSULIN ASPART PER 5 UNITS: Performed by: INTERNAL MEDICINE

## 2017-05-02 RX ORDER — WARFARIN SODIUM 4 MG/1
TABLET ORAL
Qty: 30 TABLET | Refills: 0 | Status: SHIPPED | OUTPATIENT
Start: 2017-05-02 | End: 2017-08-29

## 2017-05-02 RX ORDER — ASPIRIN 325 MG
325 TABLET ORAL DAILY
Qty: 30 TABLET | Refills: 0 | Status: SHIPPED | OUTPATIENT
Start: 2017-05-02 | End: 2017-08-29

## 2017-05-02 RX ADMIN — INSULIN ASPART 2 UNITS: 100 INJECTION, SOLUTION INTRAVENOUS; SUBCUTANEOUS at 12:34

## 2017-05-02 RX ADMIN — INSULIN ASPART 2 UNITS: 100 INJECTION, SOLUTION INTRAVENOUS; SUBCUTANEOUS at 09:29

## 2017-05-02 RX ADMIN — PANTOPRAZOLE SODIUM 40 MG: 40 TABLET, DELAYED RELEASE ORAL at 05:18

## 2017-05-02 RX ADMIN — ASPIRIN 325 MG: 325 TABLET ORAL at 09:17

## 2017-05-10 ENCOUNTER — TELEPHONE (OUTPATIENT)
Dept: NEUROSURGERY | Facility: CLINIC | Age: 67
End: 2017-05-10

## 2017-05-18 ENCOUNTER — TELEPHONE (OUTPATIENT)
Dept: NEUROLOGY | Facility: CLINIC | Age: 67
End: 2017-05-18

## 2017-06-12 ENCOUNTER — HOSPITAL ENCOUNTER (OUTPATIENT)
Dept: OTHER | Facility: HOSPITAL | Age: 67
Setting detail: SPECIMEN
Discharge: HOME | End: 2017-06-12
Attending: FAMILY MEDICINE | Admitting: FAMILY MEDICINE

## 2017-06-12 LAB
ANION GAP SERPL CALC-SCNC: 17.1 MMOL/L (ref 10–20)
BUN SERPL-MCNC: 38 MG/DL (ref 8–20)
BUN/CREAT SERPL: 18.1 (ref 6.2–20.3)
CALCIUM SERPL-MCNC: 8.9 MG/DL (ref 8.9–10.3)
CHLORIDE SERPL-SCNC: 108 MMOL/L (ref 101–111)
CONV CO2: 22 MMOL/L (ref 22–32)
CREAT UR-MCNC: 2.1 MG/DL (ref 0.7–1.2)
GLUCOSE SERPL-MCNC: 128 MG/DL (ref 65–99)
INR PPP: 1.1 (ref 2–3)
POTASSIUM SERPL-SCNC: 4.1 MMOL/L (ref 3.6–5.1)
PROTHROMBIN TIME: 12.5 SEC (ref 19.4–28.5)
SODIUM SERPL-SCNC: 143 MMOL/L (ref 136–144)

## 2017-08-02 ENCOUNTER — TELEPHONE (OUTPATIENT)
Dept: NEUROLOGY | Facility: CLINIC | Age: 67
End: 2017-08-02

## 2017-08-02 NOTE — TELEPHONE ENCOUNTER
I S/W Mrs. Armendariz concerning her .  She stated [facility] and his insurance will not cover any more therapies.  He is to have surgery this Thursday to insert a shunt in his upper left arm for dialysis.  He will need this M-W-F.  He is walking with a cane and fairly independent with ADL's.  She told me he is to have a defibrillator replacement the end of the year.  He is following up with his cardiologist Dr. Talbot at [place].  mRS 3.  We reviewed S/S of stroke and to call 911 immediately.  DUKE Brar RN

## 2017-08-14 ENCOUNTER — HOSPITAL ENCOUNTER (OUTPATIENT)
Dept: FAMILY MEDICINE CLINIC | Facility: CLINIC | Age: 67
Setting detail: SPECIMEN
Discharge: HOME | End: 2017-08-14
Attending: FAMILY MEDICINE | Admitting: FAMILY MEDICINE

## 2017-08-24 ENCOUNTER — OFFICE VISIT (OUTPATIENT)
Dept: NEUROSURGERY | Facility: CLINIC | Age: 67
End: 2017-08-24

## 2017-08-24 VITALS
RESPIRATION RATE: 18 BRPM | WEIGHT: 213 LBS | DIASTOLIC BLOOD PRESSURE: 78 MMHG | HEIGHT: 67 IN | BODY MASS INDEX: 33.43 KG/M2 | HEART RATE: 64 BPM | SYSTOLIC BLOOD PRESSURE: 130 MMHG

## 2017-08-24 DIAGNOSIS — I63.9 ISCHEMIC STROKE (HCC): Primary | ICD-10-CM

## 2017-08-24 DIAGNOSIS — H53.2 MONOCULAR DIPLOPIA, RIGHT EYE: ICD-10-CM

## 2017-08-24 PROCEDURE — 99213 OFFICE O/P EST LOW 20 MIN: CPT | Performed by: NURSE PRACTITIONER

## 2017-08-24 RX ORDER — ASPIRIN 81 MG/1
81 TABLET ORAL DAILY
COMMUNITY

## 2017-08-24 RX ORDER — HYDRALAZINE HYDROCHLORIDE 50 MG/1
50 TABLET, FILM COATED ORAL 3 TIMES DAILY
COMMUNITY
End: 2017-08-29

## 2017-08-24 RX ORDER — ISOSORBIDE MONONITRATE 30 MG/1
30 TABLET, EXTENDED RELEASE ORAL 2 TIMES DAILY
Refills: 11 | COMMUNITY
Start: 2017-07-14

## 2017-08-24 NOTE — PROGRESS NOTES
"Subjective   Patient ID: Bill Armendariz is a 66 y.o. male is here today for a hospital follow-up following stroke. Patient is accompanied by his wife.    History of Present Illness     He returns to the office today for first hospital follow-up status post acute ischemic stroke following a mechanical clot retrieval of the distal basilar artery, in April 2017.  Following the stroke, he had ongoing right sided double vision, gait instability, mild aphasia with reduction of speech, but he was conversational and he also had some minor left sided facial paralysis. He was found to have atrial fibrillation and was discharged home on Coumadin.    Today, he reports that he spent almost a month in rehabilitation in [facility] following his long hospitalization here at [facility]  He has graduated from walking with a walker to using a straight cane.  He reports one fall a few weeks ago secondary to losing his balance.  He was doing in-home physical therapy but this was discontinued by his insurance company.  He still has difficulty bathing, putting on his clothes and toileting, requiring lots of help from his wife.  His biggest ongoing issue is double vision and blurriness with the right eye.  Long as he keeps the right eye shot, his vision is fine and he sees clearly out of the left.  He also has some minimal residual numbness and tingling in the fingers of the left hand.  He does feel that this is improving.    He is now getting dialysis for end-stage renal disease.    He is pending outpatient follow-up with neurology.    He presents with his wife.    /78 (BP Location: Right arm, Patient Position: Sitting)  Pulse 64  Resp 18  Ht 67\" (170.2 cm)  Wt 213 lb (96.6 kg)  BMI 33.36 kg/m2    The following portions of the patient's history were reviewed and updated as appropriate: allergies, current medications, past family history, past medical history, past social history, past surgical history and problem " list.    Review of Systems   Constitutional: Negative for fever.   HENT: Negative for hearing loss, sore throat, tinnitus and trouble swallowing.    Eyes: Positive for visual disturbance (right eye can't focus, double vision).   Respiratory: Positive for shortness of breath (with exertion). Negative for cough and wheezing.    Cardiovascular: Negative for chest pain and palpitations.   Gastrointestinal: Negative for abdominal pain, nausea and vomiting.   Genitourinary: Negative for difficulty urinating and enuresis.   Musculoskeletal: Positive for gait problem.   Skin: Negative for rash.   Neurological: Positive for facial asymmetry and numbness (left hand). Negative for dizziness, speech difficulty, weakness, light-headedness and headaches.   Psychiatric/Behavioral: Negative for confusion and decreased concentration.       Objective   Physical Exam   Constitutional: He is oriented to person, place, and time. He appears well-developed and well-nourished. He is cooperative.  Non-toxic appearance. He does not appear ill.   HENT:   Head: Atraumatic.   Eyes: EOM are normal.   Neck: Normal range of motion. Neck supple. No tracheal deviation present. No thyromegaly present.   Pulmonary/Chest: Effort normal and breath sounds normal.   Abdominal: Soft.   Musculoskeletal: Normal range of motion. He exhibits no tenderness.   Generalized weakness throughout   Neurological: He is alert and oriented to person, place, and time. He displays no tremor and normal reflexes. A cranial nerve deficit is present. No sensory deficit. He exhibits normal muscle tone. He has an abnormal Tandem Gait Test. He has a normal Finger-Nose-Finger Test, a normal Heel to Coe Test and a normal Romberg Test. Coordination and gait abnormal. GCS eye subscore is 4. GCS verbal subscore is 5. GCS motor subscore is 6.   Ambulating with the use of a straight cane, gait is slow, wide-based and purposeful  Difficulty with toe and heel walking, unable to tandem    Skin: Skin is warm and dry.   Psychiatric: He has a normal mood and affect. His speech is normal and behavior is normal. Judgment and thought content normal.   Vitals reviewed.    Neurologic Exam     Mental Status   Oriented to person, place, and time.   Oriented to person.   Oriented to place.   Speech: speech is normal   Level of consciousness: alert  Knowledge: good.     Cranial Nerves     CN II   Right visual field deficit: upper temporal, lower temporal, lower nasal and upper nasal quadrant(s)  Left visual field deficit: none     CN III, IV, VI   Extraocular motions are normal.   Diplopia: right    CN V   Facial sensation intact.     CN VII   Facial expression full, symmetric.     CN VIII   CN VIII normal.   Hearing: intact    CN IX, X   CN IX normal.     CN XI   CN XI normal.     CN XII   CN XII normal.   Tongue: not atrophic  Tongue deviation: none    Gait, Coordination, and Reflexes     Gait  Gait: wide-based    Coordination   Romberg: negative  Finger to nose coordination: normal  Heel to shin coordination: normal  Tandem walking coordination: abnormal    Tremor   Resting tremor: absent      Assessment/Plan   Independent Review of Radiographic Studies:    No new imaging      Medical Decision Making:    He returns the office today for first postop visit status post acute ischemic stroke in April 2017 requiring emergent mechanical thrombectomy the distal basilar artery by Dr. Osorio.  Exam as noted above, no red flags.  Complete NIH stroke scale score was conducted during today's office visit and patient was found to have a stroke scale score of 0.  His biggest issue is the ongoing right eye double and blurry vision.  He is now on Xarelto for A. fib and is getting twice weekly dialysis for end-stage renal disease.  His wife reports that he is continuing to improve and he is slowly increasing his activity level.  He is following up with neurology next week.    From our standpoint, he is stable and requires no  ongoing follow up. He has greatly improved since he was last seen in the hospital.    Plan: Return to office as needed    Bill was seen today for cerebrovascular accident.    Diagnoses and all orders for this visit:    Ischemic stroke    Monocular diplopia, right eye      Return if symptoms worsen or fail to improve.

## 2017-08-29 ENCOUNTER — OFFICE VISIT (OUTPATIENT)
Dept: NEUROLOGY | Facility: CLINIC | Age: 67
End: 2017-08-29

## 2017-08-29 VITALS
OXYGEN SATURATION: 99 % | HEIGHT: 67 IN | WEIGHT: 213 LBS | BODY MASS INDEX: 33.43 KG/M2 | HEART RATE: 96 BPM | SYSTOLIC BLOOD PRESSURE: 137 MMHG | DIASTOLIC BLOOD PRESSURE: 103 MMHG

## 2017-08-29 DIAGNOSIS — I67.2 INTRACRANIAL ATHEROSCLEROSIS: ICD-10-CM

## 2017-08-29 DIAGNOSIS — I63.9 ISCHEMIC STROKE (HCC): Primary | ICD-10-CM

## 2017-08-29 PROCEDURE — 99215 OFFICE O/P EST HI 40 MIN: CPT | Performed by: RADIOLOGY

## 2017-08-29 RX ORDER — TAMSULOSIN HYDROCHLORIDE 0.4 MG/1
1 CAPSULE ORAL NIGHTLY
COMMUNITY

## 2017-08-29 NOTE — PROGRESS NOTES
"DOS: 2017  NAME: Bill Armendariz   : 1950  PCP: Isabel George MD    Chief Complaint   Patient presents with   • Stroke      Referring MD: No ref. provider found    Neurological Problem and Interval History:  66 y.o. RHW male with a Hx of Afib, CHF, HTN, HLD, DM, ESRD on HD who presents today for follow-up of stroke and is accompanied by his spouse.     Mr. Armendariz presented on  to [facility] with sudden left-sided symptoms and received IV TPA and was transferred to [facility] and underwent a mechanical embolectomy by Dr. Osorio. He was on ASA at the time of his event. He was discharged to acute rehab on ASA. He was eventually started on warfarin after going back to [facility] for CHF but was then changed to Xarelto by their PCP (Dr. George). He is followed by cardiology, Dr. Xiong. He continues with some vision problems that he describes as \"blurry vision\" and sometimes \"double vision\". He also has numbness in his left hand but has had recent shunt placement for dialysis. He is back to all his usual activities except using a walker when ambulating long distances due to shortness of breath and he also no longer drives. He denies any new stroke/TIA symptoms. He states his BP usually runs in the \"130s\". His does not check his blood sugar regularly. He denies smoking or ETOH use.     Review of Systems:        Review of Systems   Constitutional: Positive for chills and fatigue. Negative for activity change, appetite change, diaphoresis, fever and unexpected weight change.   HENT: Positive for tinnitus. Negative for drooling, hearing loss, trouble swallowing and voice change.    Eyes: Positive for visual disturbance (right eye he cannot see well). Negative for photophobia and pain.   Respiratory: Negative for chest tightness and shortness of breath.    Cardiovascular: Negative for chest pain, palpitations and leg swelling.   Gastrointestinal: Negative for blood in stool, nausea and vomiting.   Endocrine: Negative for cold " "intolerance and heat intolerance.   Genitourinary: Negative for difficulty urinating.   Musculoskeletal: Positive for gait problem (depending on the day some good and some bad). Negative for neck pain and neck stiffness.   Skin: Negative for rash.   Neurological: Positive for numbness (left hand is numb and tingling). Negative for dizziness, tremors, seizures, syncope, facial asymmetry, speech difficulty, weakness, light-headedness and headaches.   Hematological: Bruises/bleeds easily.   Psychiatric/Behavioral: Negative for agitation, behavioral problems, confusion, hallucinations, sleep disturbance and suicidal ideas. The patient is not nervous/anxious.      \"The following portions of the patient's history were reviewed and updated as appropriate: allergies, current medications, past family history, past medical history, past social history, past surgical history and problem list.\"    Review and Interpretation of Imaging:  Angiogram 4/5/17 there is a filling defect in the proximal right posterior cerebral artery with poor distal flow is also an eccentric filling defect within the basilar artery just above the right AICA, it measures 3 mm, after passes with the salt it is completely Position of the right PCA, there is a occlusion of the second order branches of the left PCA.  The filling defect in the proximal basilar artery remains  MRI brain 4/18/17: There is a infarct in the left occipital lobe with hemorrhagic conversion there are also patchy acute, strokes in the right parietal and left frontal, there is encephalomalacia in the medial right posterior cerebral artery  MRA [place] 4/18/17 shows a persistent eccentric filling defect in the basilar artery just above AICA  CTA 4/5/17: Image quality is very poor due to poor timing of the bolus however the internal carotid artery bulbs appear patent, severe tortuosity of both internal carotid arteries, the left vertebral artery origin is not well visualized " "and it may be stenotic and appears smaller than the right vertebral artery, there is a calcification in the basilar artery at the level of the PICA near the location of the filling defect seen on the angiogram    TTE 4/6/17: LVEF 26%, LV moderately dilated, diastolic dysfunction; RV cavity severely dilated, severely reduced systolic function; LA moderately dilated; RA severely dilated.     Laboratory Results:             Lab Results   Component Value Date    HGBA1C 9.85 (H) 04/06/2017     Lab Results   Component Value Date    CHOL 111 04/06/2017     Lab Results   Component Value Date    HDL 41 04/06/2017     No results found for: LDL  Lab Results   Component Value Date    TRIG 143 04/06/2017     Lab Results   Component Value Date    LDLCALC 41 04/06/2017       No components found for: CHOLHDL  No results found for: RPR  No results found for: TSH  No results found for: [id number]    Physical Examination: NIHSS: 1     mRS: 2  General Appearance:   Well developed, Overweight, well groomed, alert, and cooperative.  HEENT: Normocephalic.  Normal but limited fundoscopic exam including normal retina, discs are flat with sharp margins, normal vasculature except for cataract in left eye and ? micro hemorrhages.  Neck and Spine: Normal range of motion.  Normal alignment. No mass or tenderness. No bruits.  Cardiac: Irregularly irregular.  No murmurs.  Peripheral Vasculature: Radial and pedal pulses are equal and symmetric. No signs of distal embolization.  Extremities:    No deformities. Left arm edema, recent shunt placement in left upper arm. Normal joint ROM.  Skin:    No rashes or birth marks.    Neurological examination:  Higher Integrative  Function: Oriented place, person, president. Chooses \"2007\" from multiple choice. Normal registration, recall (3/3 with 1 clue), attention span and concentration. Normal language including comprehension, spontaneous speech, repetition, reading, writing, naming and vocabulary. No " neglect with normal visual-spatial function and construction. Literal interpretation of idioms. Fair fund of knowledge and higher integrative function.  CN II: Pupils are equal, round, and reactive to light. Left superior quadrantanopsia     CN III IV VI: Extraocular movements are full without nystagmus.  In primary gaze, right eye with slight right exotropia  CN V: Normal facial sensation and strength of muscles of mastication.  CN VII: Facial movements are symmetric. No weakness.  CN VIII:   Auditory acuity is normal.  CN IX & X:   Symmetric palatal movement.  CN XI: Sternocleidomastoid and trapezius are normal.  No weakness.  CN XII:   The tongue is midline.  No atrophy or fasciculations.  Motor: Normal muscle strength, bulk and tone in upper extremities. Lower extremities significant 4/5 hip flexor weakness bilaterally and bilateral knee flexor 4/5 weakness. Weak dorsiflexion  4/5 bilaterally. Increased tone in both legs with spastic catch in both knees.    Reflexes: 2+ in the upper extremities. 3+ at knees, absent at ankles.   Sensation: Normal to light touch, pinprick, temperature, and proprioception in arms and decreased vibration in feet. Normal graphesthesia and no extinction on DSS.  Station and Gait: Slow and cautious gait using a walker.    Coordination: Finger to nose test shows no dysmetria.  Rapid alternating movements are normal.  Heel to shin normal.    Diagnoses / Discussion:  66-year-old male with multiple vascular risk factors who presents for months status post emergent mechanical embolectomy for right posterior cerebral artery occlusion.  Neurologically he is doing quite well and prognosis for continued recovery is good if the low vision persists he may need surgical correction but I think it will improve within the next few months.  The etiology of his stroke is most likely cardioembolism from atrial fibrillation/poor LV function however he does have an eccentric calcified plaque in the  basilar artery, angiography was associated with a filling defect so this could certainly have been the source of the PCA thrombus although would not explain the strokes and multiple arterial distribution seen later on MRI.  He is on appropriate antithrombotic regimen and I favor to normal anticoagulants over warfarin due to their safety and improved efficacy.  He will need continued aggressive risk factor control.  I think he needs a follow-up scan to evaluate for the basilar artery plaque and filling defect to make sure it has not progressed significantly    Plan:   MRA head W/O contrast   Blood pressure control to <130/80   Goal LDL <70-recommend high dose statins-    Serum glucose < 140   Call 911 for stroke any stroke symptoms   Follow-up 1year if MRA stable or improved, sooner if worse  There are no diagnoses linked to this encounter.    MDM  Reviewed: previous chart and vitals  Reviewed previous: ultrasound  Interpretation: MRI, CT scan, labs and x-ray       “The above HPI was documented by SHANEKA Chambers acting as a scribe for Marlena Arciniega MD.”     “I confirm the above HPI documented by SHANEKA Chambers,  accurately reflected working decisions made by myself, Dejuan Mendiola MD.”

## 2017-09-05 ENCOUNTER — HOSPITAL ENCOUNTER (OUTPATIENT)
Dept: INTERVENTIONAL RADIOLOGY/VASCULAR | Facility: HOSPITAL | Age: 67
Discharge: HOME | End: 2017-09-05
Attending: INTERNAL MEDICINE | Admitting: INTERNAL MEDICINE

## 2017-09-09 ENCOUNTER — HOSPITAL ENCOUNTER (OUTPATIENT)
Dept: MRI IMAGING | Facility: HOSPITAL | Age: 67
Discharge: HOME | End: 2017-09-09
Admitting: NURSE PRACTITIONER

## 2017-09-09 DIAGNOSIS — I63.9 ISCHEMIC STROKE (HCC): ICD-10-CM

## 2017-09-09 PROCEDURE — 70544 MR ANGIOGRAPHY HEAD W/O DYE: CPT

## 2017-11-21 ENCOUNTER — OFFICE (AMBULATORY)
Dept: URBAN - METROPOLITAN AREA CLINIC 64 | Facility: CLINIC | Age: 67
End: 2017-11-21
Payer: COMMERCIAL

## 2017-11-21 VITALS
WEIGHT: 191 LBS | DIASTOLIC BLOOD PRESSURE: 98 MMHG | SYSTOLIC BLOOD PRESSURE: 149 MMHG | HEIGHT: 66 IN | HEART RATE: 88 BPM

## 2017-11-21 DIAGNOSIS — R63.0 ANOREXIA: ICD-10-CM

## 2017-11-21 DIAGNOSIS — K59.00 CONSTIPATION, UNSPECIFIED: ICD-10-CM

## 2017-11-21 DIAGNOSIS — R11.2 NAUSEA WITH VOMITING, UNSPECIFIED: ICD-10-CM

## 2017-11-21 DIAGNOSIS — Z86.010 PERSONAL HISTORY OF COLONIC POLYPS: ICD-10-CM

## 2017-11-21 DIAGNOSIS — R15.2 FECAL URGENCY: ICD-10-CM

## 2017-11-21 DIAGNOSIS — Z79.01 LONG TERM (CURRENT) USE OF ANTICOAGULANTS: ICD-10-CM

## 2017-11-21 DIAGNOSIS — K21.9 GASTRO-ESOPHAGEAL REFLUX DISEASE WITHOUT ESOPHAGITIS: ICD-10-CM

## 2017-11-21 DIAGNOSIS — R63.4 ABNORMAL WEIGHT LOSS: ICD-10-CM

## 2017-11-21 PROCEDURE — 99203 OFFICE O/P NEW LOW 30 MIN: CPT | Performed by: NURSE PRACTITIONER

## 2018-01-01 ENCOUNTER — OFFICE VISIT (OUTPATIENT)
Dept: NEUROLOGY | Facility: CLINIC | Age: 68
End: 2018-01-01

## 2018-01-01 VITALS
OXYGEN SATURATION: 93 % | SYSTOLIC BLOOD PRESSURE: 130 MMHG | HEART RATE: 100 BPM | DIASTOLIC BLOOD PRESSURE: 88 MMHG | BODY MASS INDEX: 27.31 KG/M2 | HEIGHT: 67 IN | WEIGHT: 174 LBS

## 2018-01-01 DIAGNOSIS — E78.2 MIXED HYPERLIPIDEMIA: ICD-10-CM

## 2018-01-01 DIAGNOSIS — N18.6 ESRD (END STAGE RENAL DISEASE) (HCC): ICD-10-CM

## 2018-01-01 DIAGNOSIS — I10 ESSENTIAL HYPERTENSION: ICD-10-CM

## 2018-01-01 DIAGNOSIS — I48.91 ATRIAL FIBRILLATION, UNSPECIFIED TYPE (HCC): ICD-10-CM

## 2018-01-01 DIAGNOSIS — H53.2 DIPLOPIA: ICD-10-CM

## 2018-01-01 DIAGNOSIS — E11.40 TYPE 2 DIABETES MELLITUS WITH DIABETIC NEUROPATHY, UNSPECIFIED LONG TERM INSULIN USE STATUS: ICD-10-CM

## 2018-01-01 DIAGNOSIS — I63.89 CEREBROVASCULAR ACCIDENT (CVA) DUE TO OTHER MECHANISM (HCC): Primary | ICD-10-CM

## 2018-01-01 PROCEDURE — 99213 OFFICE O/P EST LOW 20 MIN: CPT | Performed by: NURSE PRACTITIONER

## 2018-01-01 RX ORDER — TRAZODONE HYDROCHLORIDE 100 MG/1
100 TABLET ORAL
Refills: 1 | COMMUNITY
Start: 2018-01-01

## 2018-01-01 RX ORDER — BENZONATATE 100 MG/1
100 CAPSULE ORAL DAILY
COMMUNITY

## 2018-05-22 NOTE — PROGRESS NOTES
"DOS: 2018  NAME: Bill Armendariz   : 1950  PCP: Isabel George MD    Chief Complaint   Patient presents with   • Stroke      Neurological Problem and Interval History:  67 y.o. RHW male with a Hx of Afib, CHF, HTN, HLD, DM, ESRD on HD who presents today for follow-up of stroke and is accompanied by his spouse.     Mr. Armendariz presented on  to [facility] with sudden left-sided symptoms and received IV TPA and was transferred to [facility] and underwent a mechanical embolectomy by Dr. Osorio. He was on ASA at the time of his event. He was discharged to acute rehab on ASA. He was eventually started on warfarin after going back to [facility] for CHF but was then changed to Xarelto by their PCP (Dr. George). He is followed by cardiology, Dr. Xiong. He had defibrillator placed in January of this year. At some point he was switched to Eliquis, patient and spouse unclear when or why. When asked if he is taking his anticoagulant twice daily he states \"I think I'm taking it once\".     He states his vision is improved but that he is still not driving due to vision \"not being 100% better\". Otherwise, he states he is back to all the activities that he wants to do, He is no longer getting therapy but states his mobility has improved. He continues to use a cane when using a walker when ambulating long distances due to shortness of breath. He denies any headaches, unilateral weakness, vertigo, speech difficulty or any new stroke/TIA symptoms.He states his BP is checked regularly at dialysis and it is well controlled. He has HD MWF. He does not check his blood sugar regularly. He denies smoking or ETOH use.     Review of Systems:        Review of Systems   Constitutional: Negative for chills, fatigue and fever.   HENT: Positive for tinnitus and trouble swallowing. Negative for hearing loss.    Eyes: Positive for visual disturbance. Negative for pain, redness and itching.   Respiratory: Negative for cough, shortness of breath and " wheezing.    Cardiovascular: Positive for palpitations and leg swelling. Negative for chest pain.   Gastrointestinal: Positive for nausea. Negative for constipation, diarrhea and vomiting.   Endocrine: Negative for cold intolerance, heat intolerance and polydipsia.   Genitourinary: Positive for difficulty urinating (nava disease). Negative for decreased urine volume, frequency and urgency.   Musculoskeletal: Positive for gait problem.   Skin: Negative for color change, rash and wound.   Allergic/Immunologic: Negative for environmental allergies, food allergies and immunocompromised state.   Neurological: Positive for weakness (getting better), numbness and headaches (sometimes). Negative for dizziness, tremors, seizures, syncope, facial asymmetry, speech difficulty and light-headedness.   Hematological: Negative for adenopathy. Bruises/bleeds easily.   Psychiatric/Behavioral: Positive for sleep disturbance (getting to sleep). Negative for agitation, behavioral problems, confusion, decreased concentration, dysphoric mood, hallucinations, self-injury and suicidal ideas. The patient is not nervous/anxious and is not hyperactive.        Current Outpatient Prescriptions on File Prior to Visit   Medication Sig Dispense Refill   • aspirin 81 MG EC tablet Take 81 mg by mouth Daily.     • atorvastatin (LIPITOR) 40 MG tablet Take 40 mg by mouth Daily.     • carvedilol (COREG) 25 MG tablet Take 25 mg by mouth Daily.     • doxazosin (CARDURA) 4 MG tablet Take 4 mg by mouth Every Night.     • insulin glargine (LANTUS) 100 UNIT/ML injection Inject 30 Units under the skin Daily With Breakfast.     • insulin glargine (LANTUS) 100 UNIT/ML injection Inject 80 Units under the skin Every Night.     • isosorbide mononitrate (IMDUR) 30 MG 24 hr tablet Take 30 mg by mouth 2 (Two) Times a Day.  11   • cholecalciferol (VITAMIN D3) 1000 UNITS tablet Take 1,000 Units by mouth Daily.     • pantoprazole (PROTONIX) 40 MG EC tablet Take 40 mg by  "mouth Daily.     • rivaroxaban (XARELTO) 20 MG tablet Take 20 mg by mouth Daily.     • tamsulosin (FLOMAX) 0.4 MG capsule 24 hr capsule Take 1 capsule by mouth Every Night.       No current facility-administered medications on file prior to visit.       \"The following portions of the patient's history were reviewed and updated as appropriate: allergies, current medications, past family history, past medical history, past social history, past surgical history and problem list.\"  Review and Interpretation of Imaging: Per Dr. Mendiola,  Angiogram 4/5/17 there is a filling defect in the proximal right posterior cerebral artery with poor distal flow is also an eccentric filling defect within the basilar artery just above the right AICA, it measures 3 mm, after passes with the salt it is completely Position of the right PCA, there is a occlusion of the second order branches of the left PCA.  The filling defect in the proximal basilar artery remains  MRI brain 4/18/17: There is a infarct in the left occipital lobe with hemorrhagic conversion there are also patchy acute, strokes in the right parietal and left frontal, there is encephalomalacia in the medial right posterior cerebral artery  MRA [place] 4/18/17 shows a persistent eccentric filling defect in the basilar artery just above AICA  CTA 4/5/17: Image quality is very poor due to poor timing of the bolus however the internal carotid artery bulbs appear patent, severe tortuosity of both internal carotid arteries, the left vertebral artery origin is not well visualized and it may be stenotic and appears smaller than the right vertebral artery, there is a calcification in the basilar artery at the level of the PICA near the location of the filling defect seen on the angiogram    MRA head 9/9/18:  FINDINGS: There is no evidence of restricted diffusion to suggest acute  infarction.There is signal present within the distal aspect of the vertebral  arteries " bilaterally. There is signal loss involving the vertebral  arteries at the skull base likely artifactual secondary to in-plane  flow. There is mild stenosis involving the basilar artery, eccentric  involving the midbasilar artery to the right immediately distal to the  anterior inferior cerebral artery, unchanged. This results in stenosis  of approximately 10% to 20%. The basilar artery shows no other evidence  of stenosis. The proximal aspects of the posterior cerebral arteries  appear unremarkable. The distal aspects of the internal carotid arteries  and the proximal aspects of the anterior and middle cerebral arteries  appear unremarkable.  There is no evidence of restricted diffusion to suggest acute  infarction. Acute infarcts noted on a MRI examination of 04/05/2017 have  normalized in signal on the diffusion sequence.  IMPRESSION:  Eccentric midbasilar stenosis estimated be approximately 10%  to 20% similar in appearance as compared to the prior examination     TTE 4/6/17: LVEF 26%, LV moderately dilated, diastolic dysfunction; RV cavity severely dilated, severely reduced systolic function; LA moderately dilated; RA severely dilated.        Laboratory Results:       Lab Results   Component Value Date    WBC 9.61 04/16/2017    HGB 10.3 (L) 04/16/2017    HCT 33.0 (L) 04/16/2017    MCV 85.5 04/16/2017     04/16/2017     Lab Results   Component Value Date    GLUCOSE 166 (H) 04/30/2017    BUN 33 (H) 04/30/2017    CREATININE 1.79 (H) 04/30/2017    EGFRIFNONA 38 (L) 04/30/2017    BCR 18.4 04/30/2017    K 3.9 04/30/2017    CO2 23.5 04/30/2017    CALCIUM 8.6 04/30/2017    ALBUMIN 3.30 (L) 04/28/2017    LABIL2 1.4 04/28/2017    AST 9 04/28/2017    ALT 9 04/28/2017           Lab Results   Component Value Date    HGBA1C 9.85 (H) 04/06/2017         Lab Results   Component Value Date    CHOL 111 04/06/2017         Lab Results   Component Value Date    HDL 41 04/06/2017         Lab Results   Component Value Date     "LDL 41 04/06/2017         Lab Results   Component Value Date    TRIG 143 04/06/2017       Lab Results   Component Value Date    CHOL 111 04/06/2017    TRIG 143 04/06/2017    HDL 41 04/06/2017    LDL 41 04/06/2017         Outside labs (source: [facility] [place], 8/14/17):   A1C 7.3   No results found for: RPR  No results found for: TSH  No results found for: [id number]    Physical Examination:   General Appearance:   Well developed, abdominal obesity, well groomed, alert, and cooperative.  HEENT: Normocephalic.    Neck and Spine: Normal range of motion.  Normal alignment. No mass or tenderness.   Cardiac: Irregularly irregular.   Peripheral Vasculature: Radial pulses are equal and symmetric. No signs of distal embolization.  Extremities:    No edema or deformities. Normal joint ROM.  Skin:    No rashes or birth marks.    Neurological examination:  Higher Integrative  Function: Oriented to \"May 2017\", place and person. Normal registration, recall (3/3), attention span and concentration. Normal language including comprehension, spontaneous speech, repetition, reading, writing, naming and vocabulary. No neglect with normal visual-spatial function and construction. Normal fund of knowledge and higher integrative function.  CN II: Pupils are equal, round, and reactive to light. Left superior quadrantoanopsia    CN III IV VI: Extraocular movements are full without nystagmus. On primary gaze, left eye with right exotropia.  CN V: Normal facial sensation and strength of muscles of mastication.  CN VII: Facial movements are symmetric. No weakness.  CN VIII:   Auditory acuity is normal.  CN IX & X:   Symmetric palatal movement.  CN XI: Sternocleidomastoid and trapezius are normal.  No weakness.  CN XII:   The tongue is midline.  No atrophy or fasciculations.  Motor: Normal muscle strength, bulk and tone in upper and lower extremities.  No fasciculations, rigidity, spasticity, or abnormal movements.  Sensation: Normal to light " touch, temperature, and proprioception in arms and legs. Normal graphesthesia and no extinction on DSS.  Station and Gait: Antalgic gait, slightly stooped posture, using quad cane  Coordination: Finger to nose test shows no dysmetria.  Heel to shin normal.    Diagnoses / Discussion:  Mr. Armendariz presents today for annual follow-up of emergent mechanical embolectomy for right posterior cerebral artery occlusion. He continues to do well neurologically; however, he does continue with vision deficit and right exotropia. He has not yet followed up with opthalmology, but may be a candidate for surgical correction and have encouraged patient to follow-up. The etiology of his stroke was likely cardioembolism from atrial fibrillation/poor LV function however he did have an eccentric calcified plaque in the basilar artery, angiography was associated with a filling defect so this could certainly have been the source of the PCA thrombus although would not explain the strokes and multiple arterial distribution seen later on MRI. I reviewed his follow-up MRA head done in September with Dr. Mendiola that shows improvement. No need for further surveillance scans.     Of concern is his seemingly lack of medication compliance with Eliquis. I discussed the importance of taking Eliquis twice daily as directed, for stroke prevention. He and his spouse voiced understanding. Will obtain his most recent cardiology records. We also discussed importance of risk factor control for stroke prevention, including BP and cholesterol control. We discussed importance of calling 911 for any signs/symptoms of stroke. Follow-up in one year, sooner if symptoms warrant.     Plan:   Encouraged compliance with medications   F/U with PCP for continued cholesterol surveillance   Obtain recent cardiology notes.    Review scans Marlena -- done    Blood pressure control to <130/80   Goal LDL <70-recommend high dose statins-    Serum glucose < 140     Call  911 for stroke any stroke symptoms   Follow-up in in one year  Bill was seen today for stroke.    Diagnoses and all orders for this visit:    Cerebrovascular accident (CVA) due to other mechanism    Diplopia    Mixed hyperlipidemia    Essential hypertension    Atrial fibrillation, unspecified type    Type 2 diabetes mellitus with diabetic neuropathy, unspecified long term insulin use status    ESRD (end stage renal disease)        Coding

## 2018-05-23 PROBLEM — E11.49 TYPE 2 DIABETES MELLITUS WITH NEUROLOGIC COMPLICATION (HCC): Status: ACTIVE | Noted: 2018-01-01

## 2018-05-23 PROBLEM — E78.2 MIXED HYPERLIPIDEMIA: Status: ACTIVE | Noted: 2018-01-01

## 2018-05-23 PROBLEM — N18.6 ESRD (END STAGE RENAL DISEASE) (HCC): Status: ACTIVE | Noted: 2018-01-01

## 2018-05-23 PROBLEM — I48.91 A-FIB (HCC): Status: ACTIVE | Noted: 2018-01-01

## 2018-05-23 PROBLEM — I10 ESSENTIAL HYPERTENSION: Status: ACTIVE | Noted: 2018-01-01

## (undated) DEVICE — TBG ART PRESS 60 IN

## (undated) DEVICE — APPL CHLORAPREP W/TINT 26ML ORNG

## (undated) DEVICE — STPCK 3WY HP ROT

## (undated) DEVICE — RADIFOCUS GLIDEWIRE: Brand: GLIDEWIRE

## (undated) DEVICE — SYR LL 3CC

## (undated) DEVICE — PINNACLE R/O II INTRODUCER SHEATH WITH RADIOPAQUE MARKER: Brand: PINNACLE

## (undated) DEVICE — NDL PERC 1PRT THNWALL W/BASEPLT 18G 7CM

## (undated) DEVICE — PRESSURE MONITORING SET: Brand: TRUWAVE

## (undated) DEVICE — GLV SURG SENSICARE MICRO PF LF 8 STRL

## (undated) DEVICE — DRSNG SURESITE WNDW 4X4.5

## (undated) DEVICE — STANDARD GUIDEWIRE WITH HYDROPHILIC COATING: Brand: SYNCHRO 2

## (undated) DEVICE — BALLOON GUIDE CATHETER: Brand: MERCI 8F

## (undated) DEVICE — GW AMPLTZ SUPERSTIFF STR .035IN 260CM

## (undated) DEVICE — RADIFOCUS TORQUE DEVICE MULTI-TORQUE VISE: Brand: RADIFOCUS TORQUE DEVICE

## (undated) DEVICE — TOTAL TRAY, 16FR 10ML SIL FOLEY, URN: Brand: MEDLINE

## (undated) DEVICE — ENCORE® LATEX ORTHO SIZE 8.5, STERILE LATEX POWDER-FREE SURGICAL GLOVE: Brand: ENCORE

## (undated) DEVICE — SOL NACL 0.9PCT 1000ML

## (undated) DEVICE — TOWEL,OR,DSP,ST,BLUE,STD,4/PK,20PK/CS: Brand: MEDLINE

## (undated) DEVICE — DEV ANGIO FLOSWITCH HP BX24

## (undated) DEVICE — SHLD ANGIO 2LAYR CIR FEN

## (undated) DEVICE — PK ANGIO CERBRL RAD 40

## (undated) DEVICE — CVR EQ IMG 48X27

## (undated) DEVICE — GOWN,PREVENTION PLUS,XXLARGE,STERILE: Brand: MEDLINE

## (undated) DEVICE — KT NEURO CUST

## (undated) DEVICE — ST IV PRI VENOSET NV W/CLAMP 72IN

## (undated) DEVICE — 0.2 MICRON INTRAVENOUS FILTER FOR 96 HOUR USE WITH MICRO-BORE EXTENSION TUBING AN LUER-LOCK ADAPTER: Brand: PALL POSIDYNE® ELD FILTER

## (undated) DEVICE — EXTENSION SET, MALE LUER LOCK ADAPTER WITH RETRACTABLE COLLAR

## (undated) DEVICE — CATH DEL MARKSMAN MICRO 2.8/3.2F 10X150

## (undated) DEVICE — SPNG GZ WOVN 4X4IN 12PLY 10/BX STRL

## (undated) DEVICE — Device

## (undated) DEVICE — INJECTOR SYR FOR DYE 1CC

## (undated) DEVICE — CATH ANGIO TRCN NB BCN .038 5F 100CM DAV

## (undated) DEVICE — COPILOT BLEEDBACK CONTROL VALVE: Brand: COPILOT